# Patient Record
Sex: MALE | Race: WHITE | Employment: OTHER | URBAN - METROPOLITAN AREA
[De-identification: names, ages, dates, MRNs, and addresses within clinical notes are randomized per-mention and may not be internally consistent; named-entity substitution may affect disease eponyms.]

---

## 2017-01-03 ENCOUNTER — OFFICE VISIT (OUTPATIENT)
Dept: NEUROLOGY | Facility: CLINIC | Age: 62
End: 2017-01-03

## 2017-01-03 VITALS
HEIGHT: 73 IN | WEIGHT: 280 LBS | RESPIRATION RATE: 16 BRPM | DIASTOLIC BLOOD PRESSURE: 74 MMHG | BODY MASS INDEX: 37.11 KG/M2 | HEART RATE: 58 BPM | SYSTOLIC BLOOD PRESSURE: 132 MMHG

## 2017-01-03 DIAGNOSIS — G60.3 IDIOPATHIC PROGRESSIVE NEUROPATHY: Primary | ICD-10-CM

## 2017-01-03 DIAGNOSIS — G25.0 ESSENTIAL TREMOR: ICD-10-CM

## 2017-01-03 PROCEDURE — 99213 OFFICE O/P EST LOW 20 MIN: CPT | Performed by: OTHER

## 2017-01-03 RX ORDER — VALACYCLOVIR HYDROCHLORIDE 1 G/1
1 TABLET, FILM COATED ORAL AS NEEDED
Refills: 1 | COMMUNITY
Start: 2016-12-27 | End: 2018-05-02

## 2017-01-03 NOTE — PROGRESS NOTES
[facility] with [address]  2/24/1955  Primary Care Provider:  Dasha Diez MD    1/3/2017    62year old yo patient being seen for:  neuropathy    No changes despite getting , Rfl:   •  MULTIVITAMIN OR, one tablet daily, Disp: , Rfl:   PRN:     Allergies:    Biaxin [Clarithromy*    Rash      During today's visit, the following items were reviewed: medications, and the following relevant information are as noted in appropriate

## 2017-01-03 NOTE — PATIENT INSTRUCTIONS
Refill policies:    • Allow 2 business days for refills; controlled substances may take longer.   • Contact your pharmacy at least 5 days prior to running out of medication and have them send an electronic request or submit request through the “request re your physician has recommended that you have a procedure or additional testing performed. [facility] will contact your insurance carrier to obtain pre-certification or prior authorization.     Unfortunately, ALEKS has seen an increas

## 2017-01-05 ENCOUNTER — HOSPITAL (OUTPATIENT)
Dept: OTHER | Age: 62
End: 2017-01-05
Attending: RADIOLOGY

## 2017-01-05 ENCOUNTER — PRIOR ORIGINAL RECORDS (OUTPATIENT)
Dept: OTHER | Age: 62
End: 2017-01-05

## 2017-01-06 ENCOUNTER — PRIOR ORIGINAL RECORDS (OUTPATIENT)
Dept: OTHER | Age: 62
End: 2017-01-06

## 2017-01-06 LAB — UFCT: 22 CA SCORE

## 2017-01-17 ENCOUNTER — PRIOR ORIGINAL RECORDS (OUTPATIENT)
Dept: OTHER | Age: 62
End: 2017-01-17

## 2017-01-17 LAB
ALKALINE PHOSPHATATE(ALK PHOS): 53 IU/L
BILIRUBIN TOTAL: 0.5 MG/DL
BUN: 14 MG/DL
CALCIUM: 8.9 MG/DL
CHLORIDE: 105 MEQ/L
CHOLESTEROL, TOTAL: 227 MG/DL
CREATININE, SERUM: 1.1 MG/DL
GLUCOSE: 102 MG/DL
HDL CHOLESTEROL: 64 MG/DL
LDL CHOLESTEROL: 122 MG/DL
POTASSIUM, SERUM: 4.3 MEQ/L
PROTEIN, TOTAL: 6.9 G/DL
SGOT (AST): 18 IU/L
SGPT (ALT): 34 IU/L
SODIUM: 142 MEQ/L
TRIGLYCERIDES: 206 MG/DL

## 2017-01-20 ENCOUNTER — PRIOR ORIGINAL RECORDS (OUTPATIENT)
Dept: OTHER | Age: 62
End: 2017-01-20

## 2017-01-20 ENCOUNTER — HOSPITAL ENCOUNTER (OUTPATIENT)
Dept: CV DIAGNOSTICS | Facility: HOSPITAL | Age: 62
Discharge: HOME | End: 2017-01-20
Attending: INTERNAL MEDICINE
Payer: COMMERCIAL

## 2017-01-20 DIAGNOSIS — R07.2 CHEST PAIN, PRECORDIAL: ICD-10-CM

## 2017-01-20 DIAGNOSIS — R06.02 SHORTNESS OF BREATH: ICD-10-CM

## 2017-01-20 DIAGNOSIS — R93.1 ABNORMAL CT SCAN, HEART: ICD-10-CM

## 2017-01-20 NOTE — PROGRESS NOTES
Pt here today for a nuclear CPX ordered by Dr John Orellana. Pt expressed concern regarding walking on the treadmill.   After discussing the test with the patient, he chose to go ahead with the CPX nuclear stress test.  However, the CPX machine did not calibr

## 2017-01-24 ENCOUNTER — PRIOR ORIGINAL RECORDS (OUTPATIENT)
Dept: OTHER | Age: 62
End: 2017-01-24

## 2017-01-27 ENCOUNTER — HOSPITAL ENCOUNTER (OUTPATIENT)
Dept: CV DIAGNOSTICS | Facility: HOSPITAL | Age: 62
Discharge: HOME | End: 2017-01-27
Attending: INTERNAL MEDICINE
Payer: COMMERCIAL

## 2017-01-27 DIAGNOSIS — R06.02 SHORTNESS OF BREATH: ICD-10-CM

## 2017-01-27 DIAGNOSIS — R93.89 ABNORMAL CT SCAN: ICD-10-CM

## 2017-01-27 DIAGNOSIS — R07.2 CHEST PAIN, PRECORDIAL: ICD-10-CM

## 2017-01-27 PROCEDURE — 93018 CV STRESS TEST I&R ONLY: CPT | Performed by: INTERNAL MEDICINE

## 2017-01-27 PROCEDURE — 78452 HT MUSCLE IMAGE SPECT MULT: CPT

## 2017-01-27 PROCEDURE — 78452 HT MUSCLE IMAGE SPECT MULT: CPT | Performed by: INTERNAL MEDICINE

## 2017-01-27 PROCEDURE — 93017 CV STRESS TEST TRACING ONLY: CPT

## 2017-03-16 ENCOUNTER — PRIOR ORIGINAL RECORDS (OUTPATIENT)
Dept: OTHER | Age: 62
End: 2017-03-16

## 2017-03-16 ENCOUNTER — OFFICE VISIT (OUTPATIENT)
Dept: FAMILY MEDICINE CLINIC | Facility: CLINIC | Age: 62
End: 2017-03-16

## 2017-03-16 ENCOUNTER — LAB ENCOUNTER (OUTPATIENT)
Dept: LAB | Age: 62
End: 2017-03-16
Attending: FAMILY MEDICINE
Payer: COMMERCIAL

## 2017-03-16 VITALS
SYSTOLIC BLOOD PRESSURE: 156 MMHG | TEMPERATURE: 102 F | HEART RATE: 100 BPM | WEIGHT: 301.19 LBS | BODY MASS INDEX: 40 KG/M2 | DIASTOLIC BLOOD PRESSURE: 84 MMHG | RESPIRATION RATE: 25 BRPM

## 2017-03-16 DIAGNOSIS — R50.9 FEVER, UNSPECIFIED FEVER CAUSE: ICD-10-CM

## 2017-03-16 DIAGNOSIS — N39.0 URINARY TRACT INFECTION WITH HEMATURIA, SITE UNSPECIFIED: ICD-10-CM

## 2017-03-16 DIAGNOSIS — N39.0 URINARY TRACT INFECTION WITH HEMATURIA, SITE UNSPECIFIED: Primary | ICD-10-CM

## 2017-03-16 DIAGNOSIS — R31.9 URINARY TRACT INFECTION WITH HEMATURIA, SITE UNSPECIFIED: ICD-10-CM

## 2017-03-16 DIAGNOSIS — R31.9 URINARY TRACT INFECTION WITH HEMATURIA, SITE UNSPECIFIED: Primary | ICD-10-CM

## 2017-03-16 LAB
ALBUMIN SERPL-MCNC: 3.1 G/DL (ref 3.5–4.8)
ALP LIVER SERPL-CCNC: 104 U/L (ref 45–117)
ALT SERPL-CCNC: 45 U/L (ref 17–63)
AST SERPL-CCNC: 39 U/L (ref 15–41)
BASOPHILS # BLD AUTO: 0.02 X10(3) UL (ref 0–0.1)
BASOPHILS NFR BLD AUTO: 0.2 %
BILIRUB SERPL-MCNC: 0.7 MG/DL (ref 0.1–2)
BILIRUB UR QL STRIP.AUTO: NEGATIVE
BUN BLD-MCNC: 16 MG/DL (ref 8–20)
CALCIUM BLD-MCNC: 9.4 MG/DL (ref 8.3–10.3)
CHLORIDE: 97 MMOL/L (ref 101–111)
CO2: 26 MMOL/L (ref 22–32)
CREAT BLD-MCNC: 1.36 MG/DL (ref 0.7–1.3)
EOSINOPHIL # BLD AUTO: 0 X10(3) UL (ref 0–0.3)
EOSINOPHIL NFR BLD AUTO: 0 %
ERYTHROCYTE [DISTWIDTH] IN BLOOD BY AUTOMATED COUNT: 15.1 % (ref 11.5–16)
GLUCOSE BLD-MCNC: 123 MG/DL (ref 70–99)
GLUCOSE UR STRIP.AUTO-MCNC: NEGATIVE MG/DL
HCT VFR BLD AUTO: 42.8 % (ref 37–53)
HGB BLD-MCNC: 14.3 G/DL (ref 13–17)
IMMATURE GRANULOCYTE COUNT: 0.04 X10(3) UL (ref 0–1)
IMMATURE GRANULOCYTE RATIO %: 0.5 %
KETONES UR STRIP.AUTO-MCNC: NEGATIVE MG/DL
LYMPHOCYTES # BLD AUTO: 1.14 X10(3) UL (ref 0.9–4)
LYMPHOCYTES NFR BLD AUTO: 13.6 %
M PROTEIN MFR SERPL ELPH: 7.7 G/DL (ref 6.1–8.3)
MCH RBC QN AUTO: 26.9 PG (ref 27–33.2)
MCHC RBC AUTO-ENTMCNC: 33.4 G/DL (ref 31–37)
MCV RBC AUTO: 80.6 FL (ref 80–99)
MONOCYTES # BLD AUTO: 0.97 X10(3) UL (ref 0.1–0.6)
MONOCYTES NFR BLD AUTO: 11.5 %
MULTISTIX LOT#: NORMAL NUMERIC
NEUTROPHIL ABS PRELIM: 6.24 X10 (3) UL (ref 1.3–6.7)
NEUTROPHILS # BLD AUTO: 6.24 X10(3) UL (ref 1.3–6.7)
NEUTROPHILS NFR BLD AUTO: 74.2 %
NITRITE UR QL STRIP.AUTO: POSITIVE
NITRITE, URINE: POSITIVE
PH UR STRIP.AUTO: 5 [PH] (ref 4.5–8)
PH, URINE: 5.5 (ref 4.5–8)
PLATELET # BLD AUTO: 193 10(3)UL (ref 150–450)
POTASSIUM SERPL-SCNC: 3.6 MMOL/L (ref 3.6–5.1)
PROT UR STRIP.AUTO-MCNC: 100 MG/DL
PROTEIN (URINE DIPSTICK): >=300 MG/DL
RBC # BLD AUTO: 5.31 X10(6)UL (ref 4.3–5.7)
RBC #/AREA URNS AUTO: >10 /HPF
RED CELL DISTRIBUTION WIDTH-SD: 43.9 FL (ref 35.1–46.3)
SODIUM SERPL-SCNC: 134 MMOL/L (ref 136–144)
SP GR UR STRIP.AUTO: 1.02 (ref 1–1.03)
SPECIFIC GRAVITY: 1.02 (ref 1–1.03)
UROBILINOGEN UR STRIP.AUTO-MCNC: <2 MG/DL
UROBILINOGEN,SEMI-QN: 0.2 MG/DL (ref 0–1.9)
WBC # BLD AUTO: 8.4 X10(3) UL (ref 4–13)
WBC #/AREA URNS AUTO: >50 /HPF
WBC CLUMPS UR QL AUTO: PRESENT

## 2017-03-16 PROCEDURE — 81001 URINALYSIS AUTO W/SCOPE: CPT | Performed by: FAMILY MEDICINE

## 2017-03-16 PROCEDURE — 99214 OFFICE O/P EST MOD 30 MIN: CPT | Performed by: FAMILY MEDICINE

## 2017-03-16 PROCEDURE — 81003 URINALYSIS AUTO W/O SCOPE: CPT | Performed by: FAMILY MEDICINE

## 2017-03-16 PROCEDURE — 87086 URINE CULTURE/COLONY COUNT: CPT | Performed by: FAMILY MEDICINE

## 2017-03-16 PROCEDURE — 80053 COMPREHEN METABOLIC PANEL: CPT

## 2017-03-16 PROCEDURE — 85025 COMPLETE CBC W/AUTO DIFF WBC: CPT

## 2017-03-16 RX ORDER — CIPROFLOXACIN 500 MG/1
500 TABLET, FILM COATED ORAL 2 TIMES DAILY
Qty: 20 TABLET | Refills: 0 | Status: SHIPPED | OUTPATIENT
Start: 2017-03-16 | End: 2017-03-26

## 2017-03-16 NOTE — PATIENT INSTRUCTIONS
Check labs today. Start ciprofloxacin. Advice plenty of fluids. Check INR on Monday. Recheck in 2 weeks. Sooner if no improvement in fever  Return to clinic or go to ER if any gross blood in urine.

## 2017-03-16 NOTE — PROGRESS NOTES
[facility] [place]  PROGRESS NOTE  Chief Complaint:   Patient presents with:  Fever  Loss Of Appetite  Low Back Pain  Urinary      HPI:   This is a 65year old male presents complaining of fever, trouble passing urine for past 4 days.   Patient h Rheumatism, unspecified and fibrositis    • UTI (lower urinary tract infection) 1/15/2013   • Hernia cerebri ([address] 2/14/2014   • Obstructive sleep apnea (adult) (pediatric)      on CPAP Becki         Past Surgical History    KNEE REPLACEMENT SURGERY  [facility] acetaminophen-codeine (TYLENOL #3) 300-30 MG Oral Tab Take 1 tablet by mouth every 4 (four) hours as needed. Disp:  Rfl:    Warfarin Sodium 5 MG Oral Tab Take 5 mg by mouth.  Patient takes 7.5 mg on Sat,Sun,Tues,Thurs and takes 5 mg in Mon,Wed,Fri  Disp: Patient is alert, awake and oriented, well developed, well nourished, no apparent distress. EYES: PERRLA, EOMI, sclera anicteric, conjunctiva normal.  LUNGS: Clear to auscultation bilterally, no rales/rhonchi/wheezing.   HEART:  Regular rate and rhythm, Health Maintenance:  Annual Depression Screen due on 02/24/1955  Annual Physical due on 02/24/1958  FIT Colorectal Screening due on 02/24/2006  PSA due on 02/07/2014  Influenza Vaccine(1) due on 09/01/2016  Colonoscopy,10 Years due on 01/15/2017    P

## 2017-03-17 ENCOUNTER — TELEPHONE (OUTPATIENT)
Dept: FAMILY MEDICINE CLINIC | Facility: CLINIC | Age: 62
End: 2017-03-17

## 2017-03-17 NOTE — TELEPHONE ENCOUNTER
----- Message from Maximiliano Oshea MD sent at 3/17/2017 10:09 AM CDT -----  We will wait for urine culture result.

## 2017-03-17 NOTE — TELEPHONE ENCOUNTER
----- Message from Everette Jones MD sent at 3/17/2017 10:08 AM CDT -----  Please inform patient that his kidney functions are slightly declined this is likely secondary to dehydration due to him not having enough oral intake.   Advised so plenty of fluids,

## 2017-03-17 NOTE — TELEPHONE ENCOUNTER
Let pt know the following below. Pt verbalized his understanding and had no other questions at this time.

## 2017-03-17 NOTE — TELEPHONE ENCOUNTER
Advised to continue with antibiotics over the weekend. And also advised to push more fluids. Use ibuprofen, Tylenol for fever. Advised to return to clinic on Monday if fever does not improve.   Advised to go to emergency room if any gross blood in the ur

## 2017-03-17 NOTE — TELEPHONE ENCOUNTER
Let pt know the following below. Pt verbalized her understanding. Patient states that his fever is still running high around 102 and still is feeling the same as he did yesterday. Dr Mani Mccloud can you please advise.

## 2017-03-20 ENCOUNTER — OFFICE VISIT (OUTPATIENT)
Dept: FAMILY MEDICINE CLINIC | Facility: CLINIC | Age: 62
End: 2017-03-20

## 2017-03-20 ENCOUNTER — TELEPHONE (OUTPATIENT)
Dept: FAMILY MEDICINE CLINIC | Facility: CLINIC | Age: 62
End: 2017-03-20

## 2017-03-20 VITALS
HEIGHT: 71.5 IN | HEART RATE: 79 BPM | BODY MASS INDEX: 40.67 KG/M2 | SYSTOLIC BLOOD PRESSURE: 140 MMHG | TEMPERATURE: 98 F | OXYGEN SATURATION: 99 % | RESPIRATION RATE: 16 BRPM | WEIGHT: 297 LBS | DIASTOLIC BLOOD PRESSURE: 82 MMHG

## 2017-03-20 DIAGNOSIS — Z00.00 HEALTH CARE MAINTENANCE: ICD-10-CM

## 2017-03-20 DIAGNOSIS — L98.9 SKIN LESION: ICD-10-CM

## 2017-03-20 DIAGNOSIS — K21.9 GASTROESOPHAGEAL REFLUX DISEASE WITHOUT ESOPHAGITIS: Primary | ICD-10-CM

## 2017-03-20 PROCEDURE — 99213 OFFICE O/P EST LOW 20 MIN: CPT | Performed by: FAMILY MEDICINE

## 2017-03-20 PROCEDURE — 99396 PREV VISIT EST AGE 40-64: CPT | Performed by: FAMILY MEDICINE

## 2017-03-20 RX ORDER — ROSUVASTATIN CALCIUM 20 MG/1
20 TABLET, COATED ORAL NIGHTLY
COMMUNITY
End: 2018-12-21

## 2017-03-20 NOTE — TELEPHONE ENCOUNTER
----- Message from Adrianne Aldridge MD sent at 3/20/2017 10:26 AM CDT -----  Please inform patient that urine culture shows that he has a E. coli infection. Advised to finish the course of antibiotics and return to clinic if his symptoms does not improve.

## 2017-03-20 NOTE — PATIENT INSTRUCTIONS
Good exam     Obtain ultrasound     Referral to dr. Kurt Tate    Urine recheck after cipro is done 2 - 3days.   (and blood work)

## 2017-03-20 NOTE — PROGRESS NOTES
[facility] [place]  PROGRESS NOTE  Chief Complaint:   Patient presents with:  Physical      HPI:   This is a 65year old male coming in for general checkup. Patient has not been to a primary care physician in several years.     I reviewed his cu fL   Neutrophil Absolute Prelim 6.24 1.30-6.70 x10 (3) uL   Neutrophil Absolute 6.24 1.30-6.70 x10(3) uL   Lymphocyte Absolute 1.14 0.90-4.00 x10(3) uL   Monocyte Absolute 0.97 (H) 0.10-0.60 x10(3) uL   Eosinophil Absolute 0.00 0.00-0.30 x10(3) uL   Basoph mouth daily. (Patient taking differently: Take 200 mg by mouth as needed.) Disp: 90 capsule Rfl: 0   acetaminophen-codeine (TYLENOL #3) 300-30 MG Oral Tab Take 1 tablet by mouth every 4 (four) hours as needed.  Disp:  Rfl:    Warfarin Sodium 5 MG Oral Tab T EOMI, PERRLA, no scleral icterus, conjunctivae clear bilaterally, no eye discharge   Ears: External normal. Nose: patent, no nasal discharge   Throat:  No tonsillar erythema or exudate. Mouth:  No oral lesions or ulcerations, good dentition.     NECK: Tracy and embolism     Peripheral neuropathy (HCC)     DVT (deep venous thrombosis) (HCC)     Factor V deficiency (HCC)     Vasovagal syncope     Arthritis     Cervical stenosis of spine     Neuropathy, peripheral (HCC)     Lower urinary tract infectious disease

## 2017-03-21 ENCOUNTER — HOSPITAL ENCOUNTER (OUTPATIENT)
Dept: ULTRASOUND IMAGING | Age: 62
Discharge: HOME | End: 2017-03-21
Attending: FAMILY MEDICINE
Payer: COMMERCIAL

## 2017-03-21 DIAGNOSIS — R73.9 HYPERGLYCEMIA: Primary | ICD-10-CM

## 2017-03-21 DIAGNOSIS — L98.9 SKIN LESION: ICD-10-CM

## 2017-03-21 DIAGNOSIS — Z00.00 HEALTHCARE MAINTENANCE: ICD-10-CM

## 2017-03-21 DIAGNOSIS — B96.20 E. COLI UTI: ICD-10-CM

## 2017-03-21 DIAGNOSIS — N39.0 E. COLI UTI: ICD-10-CM

## 2017-03-21 PROCEDURE — 76700 US EXAM ABDOM COMPLETE: CPT

## 2017-03-21 NOTE — PROGRESS NOTES
Orders placed for blood work     Is patient doing follow up urinalysis and culture here or also at [facility]?

## 2017-03-23 ENCOUNTER — PRIOR ORIGINAL RECORDS (OUTPATIENT)
Dept: OTHER | Age: 62
End: 2017-03-23

## 2017-03-24 ENCOUNTER — TELEPHONE (OUTPATIENT)
Dept: FAMILY MEDICINE CLINIC | Facility: CLINIC | Age: 62
End: 2017-03-24

## 2017-03-24 NOTE — TELEPHONE ENCOUNTER
----- Message from Matt Schafer MD sent at 3/22/2017 12:55 PM CDT -----  ultrssound report revieed    Area of concern without abnormality. No hernia in the area . Liver is not enlarged. - there is fatty liver.      There is also noted mild splenomeg

## 2017-03-30 ENCOUNTER — PRIOR ORIGINAL RECORDS (OUTPATIENT)
Dept: OTHER | Age: 62
End: 2017-03-30

## 2017-03-31 ENCOUNTER — TELEPHONE (OUTPATIENT)
Dept: FAMILY MEDICINE CLINIC | Facility: CLINIC | Age: 62
End: 2017-03-31

## 2017-04-05 NOTE — TELEPHONE ENCOUNTER
Microscopic urine within normal limits. Hemoglobin A1c 6.4%, slightly elevated. CMP: Glucose 117, slightly elevated. Rest of CMP is essentially normal.  Lipid panel: Triglycerides high at 197, slightly elevated .   Rest of lipid panel within peyman

## 2017-04-05 NOTE — TELEPHONE ENCOUNTER
----- Message from Kaylynn Hall sent at 4/4/2017  4:03 PM CDT -----  Contact: pt  Test results / 2nd call

## 2017-04-05 NOTE — TELEPHONE ENCOUNTER
Patient states that he had blood work done on 3/30/17 at [facility] and has not heard the results yet. I informed that patient that we do not always receive the results from [facility].  I let him know that we did not receive his results yet.     H

## 2017-04-05 NOTE — TELEPHONE ENCOUNTER
Patient informed of recent lab results and recommendations as per Oscar Jimenez NP.     Patient states he called the lab at [facility] and asked if the results were faxed to Dr. Randi Everett for his review and they told him that they faxed the results on 3/31/1

## 2017-05-05 ENCOUNTER — PRIOR ORIGINAL RECORDS (OUTPATIENT)
Dept: OTHER | Age: 62
End: 2017-05-05

## 2017-05-11 LAB
ALBUMIN: 3.7 G/DL
ALKALINE PHOSPHATATE(ALK PHOS): 79 IU/L
BILIRUBIN TOTAL: 0.7 MG/DL
BUN: 18 MG/DL
CALCIUM: 9.1 MG/DL
CHLORIDE: 105 MEQ/L
CHOLESTEROL, TOTAL: 199 MG/DL
CREATININE, SERUM: 1.16 MG/DL
GLOBULIN: 3.2 G/DL
GLUCOSE: 117 MG/DL
HDL CHOLESTEROL: 49 MG/DL
HEMOGLOBIN A1C: 6.4 %
LDL CHOLESTEROL: 111 MG/DL
POTASSIUM, SERUM: 4.6 MEQ/L
PROTEIN, TOTAL: 6.9 G/DL
SGOT (AST): 22 IU/L
SGPT (ALT): 43 IU/L
SODIUM: 143 MEQ/L
TRIGLYCERIDES: 197 MG/DL

## 2017-05-12 LAB
ALBUMIN: 3.1 G/DL
ALKALINE PHOSPHATATE(ALK PHOS): 104 IU/L
BILIRUBIN TOTAL: 0.7 MG/DL
BUN: 16 MG/DL
CALCIUM: 9.4 MG/DL
CHLORIDE: 97 MEQ/L
CREATININE, SERUM: 1.36 MG/DL
GLUCOSE: 123 MG/DL
HEMATOCRIT: 42.8 %
HEMOGLOBIN: 14.3 G/DL
PLATELETS: 193 K/UL
POTASSIUM, SERUM: 3.6 MEQ/L
PROTEIN, TOTAL: 7.7 G/DL
RED BLOOD COUNT: 5.31 X 10-6/U
SGOT (AST): 39 IU/L
SGPT (ALT): 45 IU/L
SODIUM: 134 MEQ/L
WHITE BLOOD COUNT: 8.4 X 10-3/U

## 2017-06-12 RX ORDER — PANTOPRAZOLE SODIUM 40 MG/1
TABLET, DELAYED RELEASE ORAL
Qty: 90 TABLET | Refills: 1 | Status: SHIPPED | OUTPATIENT
Start: 2017-06-12 | End: 2018-01-27

## 2017-06-12 RX ORDER — DULOXETIN HYDROCHLORIDE 30 MG/1
CAPSULE, DELAYED RELEASE ORAL
Qty: 90 CAPSULE | Refills: 1 | Status: SHIPPED | OUTPATIENT
Start: 2017-06-12 | End: 2018-01-27 | Stop reason: SINTOL

## 2017-06-12 NOTE — TELEPHONE ENCOUNTER
Medication: Duloxetine & Pantoprazole    Date of last refill: 12/22/16 # 90  [place]     Last office visit: 1/3/2017  Due back to clinic per last office note:  RTN in 1 year by 01/03/18  Date next office visit scheduled:

## 2017-06-23 ENCOUNTER — PRIOR ORIGINAL RECORDS (OUTPATIENT)
Dept: OTHER | Age: 62
End: 2017-06-23

## 2017-06-26 ENCOUNTER — PRIOR ORIGINAL RECORDS (OUTPATIENT)
Dept: OTHER | Age: 62
End: 2017-06-26

## 2017-06-28 ENCOUNTER — PRIOR ORIGINAL RECORDS (OUTPATIENT)
Dept: OTHER | Age: 62
End: 2017-06-28

## 2017-06-28 LAB
BUN: 17 MG/DL
CALCIUM: 9.4 MG/DL
CHLORIDE: 106 MEQ/L
CREATININE, SERUM: 1.24 MG/DL
GLUCOSE: 132 MG/DL
HEMATOCRIT: 40.8 %
HEMOGLOBIN: 13.9 G/DL
INR: 3.5
PLATELETS: 183 K/UL
POTASSIUM, SERUM: 3.8 MEQ/L
RED BLOOD COUNT: 5 X 10-6/U
SODIUM: 144 MEQ/L
WHITE BLOOD COUNT: 4.6 X 10-3/U

## 2017-06-29 ENCOUNTER — PRIOR ORIGINAL RECORDS (OUTPATIENT)
Dept: OTHER | Age: 62
End: 2017-06-29

## 2017-07-03 ENCOUNTER — PRIOR ORIGINAL RECORDS (OUTPATIENT)
Dept: OTHER | Age: 62
End: 2017-07-03

## 2017-07-05 PROCEDURE — 88305 TISSUE EXAM BY PATHOLOGIST: CPT | Performed by: INTERNAL MEDICINE

## 2017-08-22 ENCOUNTER — PRIOR ORIGINAL RECORDS (OUTPATIENT)
Dept: OTHER | Age: 62
End: 2017-08-22

## 2017-08-23 ENCOUNTER — PRIOR ORIGINAL RECORDS (OUTPATIENT)
Dept: OTHER | Age: 62
End: 2017-08-23

## 2017-08-25 LAB
ALKALINE PHOSPHATATE(ALK PHOS): 53 IU/L
BILIRUBIN TOTAL: 0.6 MG/DL
BUN: 14 MG/DL
CALCIUM: 9.5 MG/DL
CHLORIDE: 105 MEQ/L
CREATININE, SERUM: 0.9 MG/DL
GLUCOSE: 92 MG/DL
HEMATOCRIT: 42.7 %
HEMOGLOBIN: 13.7 G/DL
INR: 2.6
PLATELETS: 234 K/UL
POTASSIUM, SERUM: 4.4 MEQ/L
PROTEIN, TOTAL: 6.5 G/DL
RED BLOOD COUNT: 5.1 X 10-6/U
SGOT (AST): 29 IU/L
SGPT (ALT): 28 IU/L
SODIUM: 143 MEQ/L
WHITE BLOOD COUNT: 4.7 X 10-3/U

## 2017-08-29 ENCOUNTER — TELEPHONE (OUTPATIENT)
Dept: FAMILY MEDICINE CLINIC | Facility: CLINIC | Age: 62
End: 2017-08-29

## 2017-08-29 ENCOUNTER — PRIOR ORIGINAL RECORDS (OUTPATIENT)
Dept: OTHER | Age: 62
End: 2017-08-29

## 2017-08-29 LAB
ALBUMIN: 4.8 G/DL
ALKALINE PHOSPHATASE: 53
ALT: 28
ANION GAP: 18
AST: 29
BASOPHIL %: 0
BILIRUBIN, TOTAL: 0.6 MG/DL
CALCIUM: 9.5
CHLORIDE: 105
CO2: 24
CREATININE: 0.9 MG/DL
EOSINOPHIL %: 2
GFR NON-AFRICAN AMERICAN: 91
GLUCOSE BLOOD: 92
HEMATOCRIT: 42.7 %
HEMOGLOBIN: 13.7 G/DL (ref 13–17)
LYMPHOCYTE %: 32
Lab: NEGATIVE
MCH: 27 PG
MCHC: 32 G/DL
MCV: 84 FL
MONOCYTE %: 9
MPV: 9.4
NEUTROPHIL %: 56
PLATELETS: 234 X10E3/UL
POTASSIUM: 4.4
RBC: 5.1 /HPF
RDW-CV: 15
SODIUM: 143
TOTAL PROTEIN: 6.5
UREA NITROGEN (BODY FLUID): 14
WBC: 4.7 /HPF

## 2017-08-30 ENCOUNTER — TELEPHONE (OUTPATIENT)
Dept: FAMILY MEDICINE CLINIC | Facility: CLINIC | Age: 62
End: 2017-08-30

## 2017-08-30 ENCOUNTER — OFFICE VISIT (OUTPATIENT)
Dept: FAMILY MEDICINE CLINIC | Facility: CLINIC | Age: 62
End: 2017-08-30

## 2017-08-30 VITALS
TEMPERATURE: 98 F | WEIGHT: 289 LBS | HEIGHT: 71.5 IN | HEART RATE: 78 BPM | DIASTOLIC BLOOD PRESSURE: 76 MMHG | RESPIRATION RATE: 16 BRPM | SYSTOLIC BLOOD PRESSURE: 120 MMHG | BODY MASS INDEX: 39.58 KG/M2

## 2017-08-30 DIAGNOSIS — M17.11 PRIMARY OSTEOARTHRITIS OF RIGHT KNEE: ICD-10-CM

## 2017-08-30 DIAGNOSIS — Z86.718 HISTORY OF DVT (DEEP VEIN THROMBOSIS): ICD-10-CM

## 2017-08-30 DIAGNOSIS — Z01.818 PREOPERATIVE EXAMINATION: Primary | ICD-10-CM

## 2017-08-30 PROCEDURE — 99243 OFF/OP CNSLTJ NEW/EST LOW 30: CPT | Performed by: FAMILY MEDICINE

## 2017-08-30 RX ORDER — CELECOXIB 200 MG/1
200 CAPSULE ORAL AS NEEDED
COMMUNITY
End: 2018-03-05

## 2017-08-30 NOTE — PROGRESS NOTES
[facility] [place]  PROGRESS NOTE  Chief Complaint:   Patient presents with:  Pre-Op Exam      HPI:   This is a 65year old male coming in for preoperative evaluation. Patient has had long history for right knee discomfort and osteoarthritis. (pulmonary embolism) (HCC)          on chronic coumadin    •  Esophageal reflux      •  Prostate cancer (HCC)      •  Muscle weakness (generalized)      •  Displacement of lumbar intervertebral disc without myelopathy      •  Myalgia and myositis, unspecif throat  INTEGUMENTARY:  Denies rashes, itching.     CARDIOVASCULAR:   SEE HPI    Denies chest pain, chest pressure, chest discomfort, palpitations, edema, dyspnea on exertion or at rest.  RESPIRATORY:  Denies shortness of breath, wheezing, cough or sputum p normal gait, strength and tone, sensory intact,  PSYCH: no depression or anxiety noted. ASSESSMENT AND PLAN:   1. Preoperative examination  2. Primary osteoarthritis of right knee      3.  History of DVT (deep vein thrombosis)      Medically stable and c

## 2017-08-30 NOTE — TELEPHONE ENCOUNTER
Patient informed that Dr. Fabian Chau received the EKG and lab results for this afternoons appointment.

## 2017-08-30 NOTE — TELEPHONE ENCOUNTER
Has an appt this afternoon with Dr Rasheed Vo. Wanting to know if Dr Rasheed Vo has received the labs from [facility]. Please call back.

## 2017-09-05 ENCOUNTER — TELEPHONE (OUTPATIENT)
Dept: FAMILY MEDICINE CLINIC | Facility: CLINIC | Age: 62
End: 2017-09-05

## 2017-11-03 ENCOUNTER — MYAURORA ACCOUNT LINK (OUTPATIENT)
Dept: OTHER | Age: 62
End: 2017-11-03

## 2017-11-03 ENCOUNTER — PRIOR ORIGINAL RECORDS (OUTPATIENT)
Dept: OTHER | Age: 62
End: 2017-11-03

## 2017-11-13 RX ORDER — TOPIRAMATE 100 MG/1
TABLET, FILM COATED ORAL
Qty: 180 TABLET | Refills: 1 | Status: SHIPPED | OUTPATIENT
Start: 2017-11-13 | End: 2017-12-21

## 2017-11-13 NOTE — TELEPHONE ENCOUNTER
Medication: Topiramate      Date of last refill: 101/19/16 with 1 addt refill # 180  [place]      Last office visit: 1/3/2017  Due back to clinic per last office note:  RTN in 1 year by 01/03/18  Date next office visit s

## 2017-11-27 ENCOUNTER — CHARTING TRANS (OUTPATIENT)
Dept: OTHER | Age: 62
End: 2017-11-27

## 2017-12-09 ENCOUNTER — LABORATORY ENCOUNTER (OUTPATIENT)
Dept: LAB | Age: 62
End: 2017-12-09
Attending: FAMILY MEDICINE
Payer: COMMERCIAL

## 2017-12-09 ENCOUNTER — OFFICE VISIT (OUTPATIENT)
Dept: FAMILY MEDICINE CLINIC | Facility: CLINIC | Age: 62
End: 2017-12-09

## 2017-12-09 VITALS
HEART RATE: 72 BPM | RESPIRATION RATE: 16 BRPM | TEMPERATURE: 98 F | BODY MASS INDEX: 41.48 KG/M2 | WEIGHT: 296.25 LBS | DIASTOLIC BLOOD PRESSURE: 66 MMHG | SYSTOLIC BLOOD PRESSURE: 132 MMHG | HEIGHT: 71 IN

## 2017-12-09 DIAGNOSIS — N30.00 ACUTE CYSTITIS WITHOUT HEMATURIA: Primary | ICD-10-CM

## 2017-12-09 DIAGNOSIS — N30.00 ACUTE CYSTITIS WITHOUT HEMATURIA: ICD-10-CM

## 2017-12-09 PROBLEM — Z96.651 STATUS POST TOTAL RIGHT KNEE REPLACEMENT: Status: ACTIVE | Noted: 2017-09-07

## 2017-12-09 PROBLEM — E78.2 MIXED HYPERLIPIDEMIA: Status: ACTIVE | Noted: 2017-09-07

## 2017-12-09 PROBLEM — I82.401 DEEP VEIN THROMBOSIS (DVT) OF RIGHT LOWER EXTREMITY (HCC): Status: ACTIVE | Noted: 2017-08-23

## 2017-12-09 PROBLEM — Z86.711 PERSONAL HISTORY OF PULMONARY EMBOLISM: Status: ACTIVE | Noted: 2017-08-23

## 2017-12-09 PROCEDURE — 87088 URINE BACTERIA CULTURE: CPT | Performed by: FAMILY MEDICINE

## 2017-12-09 PROCEDURE — 87086 URINE CULTURE/COLONY COUNT: CPT | Performed by: FAMILY MEDICINE

## 2017-12-09 PROCEDURE — 87186 SC STD MICRODIL/AGAR DIL: CPT | Performed by: FAMILY MEDICINE

## 2017-12-09 PROCEDURE — 81001 URINALYSIS AUTO W/SCOPE: CPT | Performed by: FAMILY MEDICINE

## 2017-12-09 PROCEDURE — 99214 OFFICE O/P EST MOD 30 MIN: CPT | Performed by: FAMILY MEDICINE

## 2017-12-09 RX ORDER — SULFAMETHOXAZOLE AND TRIMETHOPRIM 800; 160 MG/1; MG/1
1 TABLET ORAL 2 TIMES DAILY
Qty: 20 TABLET | Refills: 0 | Status: SHIPPED | OUTPATIENT
Start: 2017-12-09 | End: 2017-12-19

## 2017-12-09 RX ORDER — RIVAROXABAN 20 MG/1
1 TABLET, FILM COATED ORAL DAILY
COMMUNITY
Start: 2017-11-06 | End: 2018-12-21

## 2017-12-09 NOTE — PROGRESS NOTES
Chief Complaint:   Patient presents with:  Low Back Pain: Lower back pain, and joint pain.  Jose stated he thinks its due to a UTI  Nasal Congestion: Cough, sneezing, and nasal congestion      HPI:   This is a 65year old male coming in for back discomfo MOUTH DAILY Disp: 90 tablet Rfl: 1   Rosuvastatin Calcium 20 MG Oral Tab Take 20 mg by mouth nightly. Disp:  Rfl:    ValACYclovir HCl 1 G Oral Tab Take 1 tablet by mouth as needed.    Disp:  Rfl: 1   Metoprolol Tartrate (LOPRESSOR) 50 MG Oral Tab Take 75 mg oriented, well developed, well nourished  Male    , no apparent distress. HEENT:  Head:  Normocephalic, atraumatic    NECK: Supple,  no JVD, no thyromegaly. SKIN: No rashes, no skin lesion, no bruising, good turgor.     HEART:  Regular rate and rhythm, no cystitis without hematuria     Personal history of pulmonary embolism     Osteoarthritis     Malignant tumor of prostate ([address]     Mixed hyperlipidemia     Migraine headache     Impotence     Herpes simplex virus (HSV) infection     Deep vein thrombosis (D

## 2017-12-11 ENCOUNTER — TELEPHONE (OUTPATIENT)
Dept: FAMILY MEDICINE CLINIC | Facility: CLINIC | Age: 62
End: 2017-12-11

## 2017-12-11 NOTE — TELEPHONE ENCOUNTER
Patient notified of results and recommendations, patient expressed understanding and thanks. Patient also wanted to know if he should take his cephalexin 500 mg on Friday. States this 1hr prior to dental procedure has appt this Friday. Please advise?

## 2017-12-11 NOTE — TELEPHONE ENCOUNTER
----- Message from ANTONIA Sawant sent at 12/11/2017  9:16 AM CST -----  Dr. Bryan Blair's patient. Please let patient know his urine culture is positive for E. coli. He is on an appropriate antibiotic.   If continues to have symptoms, needs a follo

## 2017-12-12 ENCOUNTER — PRIOR ORIGINAL RECORDS (OUTPATIENT)
Dept: OTHER | Age: 62
End: 2017-12-12

## 2017-12-18 ENCOUNTER — TELEPHONE (OUTPATIENT)
Dept: FAMILY MEDICINE CLINIC | Facility: CLINIC | Age: 62
End: 2017-12-18

## 2017-12-18 NOTE — TELEPHONE ENCOUNTER
Patient informed of recommendations from Pradip Reyna NP. Patient states that he would like to see what Dr. Roxanne Campos recommends tomorrow.

## 2017-12-18 NOTE — TELEPHONE ENCOUNTER
I recommend pt make appt with provider to reassess issue, not lab appt; thus will not provide order. Please have pt make appt with provider.

## 2017-12-18 NOTE — TELEPHONE ENCOUNTER
Patient states that he completed the 10 day course of antibiotic and thought he was feeling better and then on Saturday evening he started having low back pain and fatigue again.   He states that the last time this happened he had to be on 2 rounds of antib

## 2017-12-18 NOTE — TELEPHONE ENCOUNTER
Appt made to recheck urine specimen with lab post antibiotics as per patient's request and will follow up with Dr. Saint Ammons if abnormal.  Order needed, please advise.

## 2017-12-19 ENCOUNTER — TELEPHONE (OUTPATIENT)
Dept: FAMILY MEDICINE CLINIC | Facility: CLINIC | Age: 62
End: 2017-12-19

## 2017-12-19 DIAGNOSIS — N30.00 ACUTE CYSTITIS WITHOUT HEMATURIA: Primary | ICD-10-CM

## 2017-12-19 NOTE — TELEPHONE ENCOUNTER
Patient informed that Dr. Torri Corea placed an order to recheck the urine for tomorrow and proceed with appt.

## 2017-12-19 NOTE — TELEPHONE ENCOUNTER
Lab order needed for tomorrow's appt, please advise. Future appt:     Your appointments     Date & Time Appointment Department [facility])    Dec 20, 2017 11:15 AM CST Laboratory Visit with [facility] ([facility])    Oct 01,

## 2017-12-19 NOTE — TELEPHONE ENCOUNTER
----- Message from Lelo Sykes sent at 12/19/2017  3:06 PM CST -----  Waiting to hear back from call yesterday.

## 2017-12-20 ENCOUNTER — APPOINTMENT (OUTPATIENT)
Dept: LAB | Age: 62
End: 2017-12-20
Attending: FAMILY MEDICINE
Payer: COMMERCIAL

## 2017-12-20 DIAGNOSIS — N30.00 ACUTE CYSTITIS WITHOUT HEMATURIA: ICD-10-CM

## 2017-12-20 PROCEDURE — 81003 URINALYSIS AUTO W/O SCOPE: CPT | Performed by: FAMILY MEDICINE

## 2017-12-21 ENCOUNTER — TELEPHONE (OUTPATIENT)
Dept: FAMILY MEDICINE CLINIC | Facility: CLINIC | Age: 62
End: 2017-12-21

## 2017-12-21 ENCOUNTER — OFFICE VISIT (OUTPATIENT)
Dept: FAMILY MEDICINE CLINIC | Facility: CLINIC | Age: 62
End: 2017-12-21

## 2017-12-21 VITALS
SYSTOLIC BLOOD PRESSURE: 128 MMHG | HEIGHT: 71.5 IN | BODY MASS INDEX: 40.4 KG/M2 | DIASTOLIC BLOOD PRESSURE: 72 MMHG | WEIGHT: 295 LBS | RESPIRATION RATE: 20 BRPM | OXYGEN SATURATION: 98 % | TEMPERATURE: 98 F | HEART RATE: 70 BPM

## 2017-12-21 DIAGNOSIS — J01.00 ACUTE NON-RECURRENT MAXILLARY SINUSITIS: Primary | ICD-10-CM

## 2017-12-21 PROCEDURE — 99214 OFFICE O/P EST MOD 30 MIN: CPT | Performed by: NURSE PRACTITIONER

## 2017-12-21 RX ORDER — AMOXICILLIN 500 MG/1
500 CAPSULE ORAL 3 TIMES DAILY
Qty: 30 CAPSULE | Refills: 0 | Status: SHIPPED | OUTPATIENT
Start: 2017-12-21 | End: 2017-12-31

## 2017-12-21 NOTE — PROGRESS NOTES
Chief complaint:  Patient presents with:  Cough: chest congestion, sinus pressure, sinus congestion-clear/[id number]     HPI:   Balta Rodas is a 65year old male who presents for upper respiratory symptoms.  C/o: productive Cough, sinus congestion and Warfarin Sodium 5 MG Oral Tab Taking 7.5 mg on Monday/ Thursday and 5 mg the remaining 5 days  Disp:  Rfl:       Past Medical History:   Diagnosis Date   • Backache, unspecified    • Displacement of lumbar intervertebral disc without myelopathy    • DVT (d REVIEW OF SYSTEMS:   GENERAL: feels well otherwise  SKIN: no rashes  EYES:denies blurred vision or double vision  HEENT: see HPI  LUNGS: see HPI  CARDIOVASCULAR: denies chest pain on exertion  GI: no nausea or abdominal pain  NEURO: denies headaches    EXA Flonase (fluticasone is generic) nasal spray--1 spray each nostril twice a day. Look down at toes when using this nasal spray. Tylenol over the counter for discomfort. Coricidin over the counter for congestion symptom relief.   Return if symptoms worsen o ABRS may be diagnosed if you’ve had an upper respiratory infection like a cold and cough for 10 or more days without improvement or with worsening symptoms. Your healthcare provider will ask about your symptoms and your medical history.  The provider will c © 9785-2422 [address] [facility] [place]. All rights reserved. This information is not intended as a substitute for professional medical care. Always follow your healthcare professional's instructions.             The

## 2017-12-21 NOTE — TELEPHONE ENCOUNTER
Notified patient of results. Patient states he is not feeling better has URI sxs. States he has bad cough, chest congestion, sore throat. Advised patient to make appt. States lives in 80year old Mother in law.

## 2017-12-21 NOTE — TELEPHONE ENCOUNTER
Dr. Van Jenkins is out of office today. UA was normal. Please let patient know. Thank you.  Kiara Snyder, 12/21/17, 11:10 AM

## 2017-12-21 NOTE — PATIENT INSTRUCTIONS
Push fluids, rest.  Antibiotic as prescribed, take with food. Flonase (fluticasone is generic) nasal spray--1 spray each nostril twice a day. Look down at toes when using this nasal spray. Tylenol over the counter for discomfort.   Coricidin over the coun ABRS may be diagnosed if you’ve had an upper respiratory infection like a cold and cough for 10 or more days without improvement or with worsening symptoms. Your healthcare provider will ask about your symptoms and your medical history.  The provider will c © 6084-8023 [address] [facility] [place]. All rights reserved. This information is not intended as a substitute for professional medical care. Always follow your healthcare professional's instructions.

## 2017-12-23 ENCOUNTER — TELEPHONE (OUTPATIENT)
Dept: FAMILY MEDICINE CLINIC | Facility: CLINIC | Age: 62
End: 2017-12-23

## 2017-12-23 NOTE — TELEPHONE ENCOUNTER
----- Message from Madeleine Marques MD sent at 12/21/2017  8:06 AM CST -----  Repeat UA  - all normalized . No further blood or protein     Recommend recheck in future.

## 2018-01-05 ENCOUNTER — PRIOR ORIGINAL RECORDS (OUTPATIENT)
Dept: OTHER | Age: 63
End: 2018-01-05

## 2018-01-05 LAB
ALT (SGPT): 32 U/L
AST (SGOT): 23 U/L
CHOLESTEROL, TOTAL: 209 MG/DL
HDL CHOLESTEROL: 63 MG/DL
LDL CHOLESTEROL: 116 MG/DL
TRIGLYCERIDES: 149 MG/DL

## 2018-01-23 ENCOUNTER — TELEPHONE (OUTPATIENT)
Dept: FAMILY MEDICINE CLINIC | Facility: CLINIC | Age: 63
End: 2018-01-23

## 2018-01-23 RX ORDER — PANTOPRAZOLE SODIUM 40 MG/1
TABLET, DELAYED RELEASE ORAL
Qty: 90 TABLET | Refills: 0 | OUTPATIENT
Start: 2018-01-23

## 2018-01-23 RX ORDER — PANTOPRAZOLE SODIUM 40 MG/1
40 TABLET, DELAYED RELEASE ORAL
Qty: 90 TABLET | Refills: 1 | Status: CANCELLED | OUTPATIENT
Start: 2018-01-23

## 2018-01-23 RX ORDER — DULOXETIN HYDROCHLORIDE 30 MG/1
CAPSULE, DELAYED RELEASE ORAL
Qty: 90 CAPSULE | Refills: 0 | OUTPATIENT
Start: 2018-01-23

## 2018-01-23 NOTE — TELEPHONE ENCOUNTER
Patient wants to know how he can wean off Cymbalta . Patient states that he will talk to primary Dr to wean off cymbalta.  Cx appt on 02/05/18 and to cancel refill request for cymbalta and pantoprazole

## 2018-01-23 NOTE — TELEPHONE ENCOUNTER
Patient was in to see Dr. Niya Noonan as a new patient on 12/9/17  States he discussed having Dr. Niya Noonan taking over prescribing his medications  States he takes pantoprazole and would like a 90 day refill to [address]    Also states hi

## 2018-01-25 NOTE — TELEPHONE ENCOUNTER
Patient notified of Dr. Kalpesh Blair's below recommendations.  Appt made discuss medications   Future Appointments  Date Time Provider Serjio Dahiana   1/27/2018 9:00 AM Ronald Ramos MD [facility] [place] [facility] [place]   2/5/2018 1:00 PM Gela Antunez P

## 2018-01-25 NOTE — TELEPHONE ENCOUNTER
Chart reviewed     Patient needs appointment     I do not see that I have seen regarding duloxetine use - I would need appointment to evaluate regarding weaning.      RE:  Pantoprazole  - patient was referred to Gastroenterologist - patient should be contac

## 2018-01-27 ENCOUNTER — OFFICE VISIT (OUTPATIENT)
Dept: FAMILY MEDICINE CLINIC | Facility: CLINIC | Age: 63
End: 2018-01-27

## 2018-01-27 VITALS
SYSTOLIC BLOOD PRESSURE: 118 MMHG | OXYGEN SATURATION: 96 % | DIASTOLIC BLOOD PRESSURE: 70 MMHG | WEIGHT: 304 LBS | BODY MASS INDEX: 41.63 KG/M2 | RESPIRATION RATE: 17 BRPM | HEART RATE: 66 BPM | TEMPERATURE: 98 F | HEIGHT: 71.5 IN

## 2018-01-27 DIAGNOSIS — G60.3 IDIOPATHIC PROGRESSIVE NEUROPATHY: ICD-10-CM

## 2018-01-27 DIAGNOSIS — K21.00 GASTROESOPHAGEAL REFLUX DISEASE WITH ESOPHAGITIS: Primary | ICD-10-CM

## 2018-01-27 DIAGNOSIS — F34.1 DYSTHYMIA: ICD-10-CM

## 2018-01-27 PROCEDURE — 99214 OFFICE O/P EST MOD 30 MIN: CPT | Performed by: FAMILY MEDICINE

## 2018-01-27 RX ORDER — DULOXETIN HYDROCHLORIDE 20 MG/1
20 CAPSULE, DELAYED RELEASE ORAL DAILY
Qty: 30 CAPSULE | Refills: 2 | Status: SHIPPED | OUTPATIENT
Start: 2018-01-27 | End: 2018-05-23

## 2018-01-27 RX ORDER — PANTOPRAZOLE SODIUM 40 MG/1
40 TABLET, DELAYED RELEASE ORAL
Qty: 90 TABLET | Refills: 3 | Status: SHIPPED | OUTPATIENT
Start: 2018-01-27 | End: 2018-12-21

## 2018-01-27 NOTE — PROGRESS NOTES
Chief Complaint:   Patient presents with:  Medication Follow-Up: discuss medication change      HPI:   This is a 65year old male coming in for follow-up visit regarding 2 issues: #1 chronic GERD including Dias's esophagus.     Patient with long history POLYPECTOMY 51072;  Surgeon: Farnaz Kan MD;  Location: [address] [place] [address]: KNEE REPLACEMENT SURGERY      Comment: left 2000 & 2008  No date: MELANOMA MONITOR PROFILE      Comment: Left ear  No date: OTHER      Comment: lymph Throat:  Denies hearing loss,or disturbance. Denies sore throat  INTEGUMENTARY:  Denies rashes, itching. CARDIOVASCULAR: Denies  chest discomfort, palpitations, edema,  RESPIRATORY: see HPI    Denies shortness of breath, wheezing, or sputum production. esophagitis      2. Idiopathic progressive neuropathy  3. Dysthymia  Trial of weaning down on duloxetine. Main complaint has been tiredness.         Meds & Refills for this Visit:  Signed Prescriptions Disp Refills    Pantoprazole Sodium 40 MG Oral Tab EC 9 Status post total right knee replacement     Ventral hernia     Gastroesophageal reflux disease with esophagitis     Dysthymia      JULES FLETCHER MD  1/27/2018  11:59 AM

## 2018-01-27 NOTE — PATIENT INSTRUCTIONS
Refill for pantoprazole sent     Wean duloxetine   Taking alternating 30 with 20 daily for 1 month   Then switch to 20 daily     Recheck with me in 2 months.

## 2018-03-05 ENCOUNTER — TELEPHONE (OUTPATIENT)
Dept: FAMILY MEDICINE CLINIC | Facility: CLINIC | Age: 63
End: 2018-03-05

## 2018-03-05 ENCOUNTER — OFFICE VISIT (OUTPATIENT)
Dept: FAMILY MEDICINE CLINIC | Facility: CLINIC | Age: 63
End: 2018-03-05

## 2018-03-05 VITALS
BODY MASS INDEX: 45 KG/M2 | OXYGEN SATURATION: 98 % | TEMPERATURE: 98 F | DIASTOLIC BLOOD PRESSURE: 86 MMHG | WEIGHT: 315 LBS | HEART RATE: 70 BPM | SYSTOLIC BLOOD PRESSURE: 134 MMHG

## 2018-03-05 DIAGNOSIS — M54.50 ACUTE BILATERAL LOW BACK PAIN WITHOUT SCIATICA: ICD-10-CM

## 2018-03-05 DIAGNOSIS — N39.0 RECURRENT UTI: Primary | ICD-10-CM

## 2018-03-05 LAB
BILIRUB UR QL STRIP.AUTO: NEGATIVE
BILIRUBIN: NEGATIVE
CLARITY UR REFRACT.AUTO: CLEAR
COLOR UR AUTO: YELLOW
GLUCOSE (URINE DIPSTICK): NEGATIVE MG/DL
GLUCOSE UR STRIP.AUTO-MCNC: NEGATIVE MG/DL
KETONES (URINE DIPSTICK): NEGATIVE MG/DL
KETONES UR STRIP.AUTO-MCNC: NEGATIVE MG/DL
LEUKOCYTE ESTERASE UR QL STRIP.AUTO: NEGATIVE
LEUKOCYTES: NEGATIVE
MULTISTIX LOT#: NORMAL NUMERIC
NITRITE UR QL STRIP.AUTO: NEGATIVE
NITRITE, URINE: NEGATIVE
PH UR STRIP.AUTO: 6 [PH] (ref 4.5–8)
PH, URINE: 6 (ref 4.5–8)
PROT UR STRIP.AUTO-MCNC: NEGATIVE MG/DL
PROTEIN (URINE DIPSTICK): NEGATIVE MG/DL
RBC UR QL AUTO: NEGATIVE
SP GR UR STRIP.AUTO: 1.01 (ref 1–1.03)
SPECIFIC GRAVITY: 1.01 (ref 1–1.03)
URINE-COLOR: YELLOW
UROBILINOGEN UR STRIP.AUTO-MCNC: <2 MG/DL
UROBILINOGEN,SEMI-QN: 0.2 MG/DL (ref 0–1.9)

## 2018-03-05 PROCEDURE — 99214 OFFICE O/P EST MOD 30 MIN: CPT | Performed by: NURSE PRACTITIONER

## 2018-03-05 PROCEDURE — 81001 URINALYSIS AUTO W/SCOPE: CPT | Performed by: NURSE PRACTITIONER

## 2018-03-05 PROCEDURE — 81003 URINALYSIS AUTO W/O SCOPE: CPT | Performed by: NURSE PRACTITIONER

## 2018-03-05 RX ORDER — CYCLOBENZAPRINE HCL 10 MG
10 TABLET ORAL NIGHTLY
Qty: 30 TABLET | Refills: 1 | Status: SHIPPED | OUTPATIENT
Start: 2018-03-05 | End: 2018-03-25

## 2018-03-05 RX ORDER — CELECOXIB 200 MG/1
200 CAPSULE ORAL 2 TIMES DAILY
Qty: 60 CAPSULE | Refills: 1 | Status: SHIPPED | OUTPATIENT
Start: 2018-03-05 | End: 2019-03-29 | Stop reason: ALTCHOICE

## 2018-03-05 NOTE — PROGRESS NOTES
Patient ID:   Leonor Stern  is a 59year old male    Allergies    Biaxin [Clarithromy*    Rash  Medications     celecoxib 200 MG Oral Cap Take 1 capsule (200 mg total) by mouth 2 (two) times daily.  Disp: 60 capsule Rfl: 1   Cyclobenzaprine HCl 10 MG Oral well-developed and well-nourished. No distress. Cardiovascular: Regular rate and rhythm no murmur. Pulmonary: Clear to auscultation bilaterally   Abdominal: Soft. Non tender, no guarding, no rebound, no masses. No hepatosplenomegaly.   No costovertebra

## 2018-03-05 NOTE — TELEPHONE ENCOUNTER
Chart reviewed     Last UA Dec 2017 - was normal     Last seen Jan 2018 with follow up appointment in April     Last physical mar 2017.      REcommend change f/u appointment to a physical and we can do repeat UA/ culture at that time

## 2018-03-05 NOTE — PATIENT INSTRUCTIONS
Take Celebrex for back pain     Muscle relaxant at bed time as needed     Heating pad      Will send urine out for culture-  We will let you know.      Follow up if symptoms persist or increase

## 2018-03-05 NOTE — TELEPHONE ENCOUNTER
Patient calling for UA/Culture order. Per last lab - \"Repeat UA  - all normalized .  No further blood or protein     Recommend recheck in future. \"     Please advise for lab order? Can call back patient to schedule appt if advised.

## 2018-03-05 NOTE — TELEPHONE ENCOUNTER
Called patient to inform him of this, states that he does not want to wait because he is having UTI symptoms. He has had many UTIs in the past. States he has low back pain, fowl smelling urine, fatigue - all sxs that he has experienced before.      Is wanti

## 2018-03-06 ENCOUNTER — TELEPHONE (OUTPATIENT)
Dept: FAMILY MEDICINE CLINIC | Facility: CLINIC | Age: 63
End: 2018-03-06

## 2018-03-06 ENCOUNTER — PRIOR ORIGINAL RECORDS (OUTPATIENT)
Dept: OTHER | Age: 63
End: 2018-03-06

## 2018-03-06 NOTE — TELEPHONE ENCOUNTER
----- Message from ANTONIA Espinoza sent at 3/6/2018  8:18 AM CST -----  Please notify patient that hurts his urine is normal, no infection. Patient to treat back pain as we discussed–follow-up if symptoms persist or increase.

## 2018-03-20 ENCOUNTER — PRIOR ORIGINAL RECORDS (OUTPATIENT)
Dept: OTHER | Age: 63
End: 2018-03-20

## 2018-04-03 ENCOUNTER — MYAURORA ACCOUNT LINK (OUTPATIENT)
Dept: OTHER | Age: 63
End: 2018-04-03

## 2018-04-23 ENCOUNTER — OFFICE VISIT (OUTPATIENT)
Dept: FAMILY MEDICINE CLINIC | Facility: CLINIC | Age: 63
End: 2018-04-23

## 2018-04-23 VITALS
TEMPERATURE: 98 F | WEIGHT: 315 LBS | DIASTOLIC BLOOD PRESSURE: 80 MMHG | BODY MASS INDEX: 43 KG/M2 | SYSTOLIC BLOOD PRESSURE: 132 MMHG | RESPIRATION RATE: 16 BRPM | HEART RATE: 56 BPM

## 2018-04-23 DIAGNOSIS — G60.3 IDIOPATHIC PROGRESSIVE NEUROPATHY: ICD-10-CM

## 2018-04-23 DIAGNOSIS — F34.1 DYSTHYMIA: ICD-10-CM

## 2018-04-23 DIAGNOSIS — K21.00 GASTROESOPHAGEAL REFLUX DISEASE WITH ESOPHAGITIS: Primary | ICD-10-CM

## 2018-04-23 PROCEDURE — 99214 OFFICE O/P EST MOD 30 MIN: CPT | Performed by: FAMILY MEDICINE

## 2018-04-23 NOTE — PROGRESS NOTES
Chief Complaint:   Patient presents with:  Medication Follow-Up      HPI:   This is a 59year old male coming in for follow-up care regarding his regular medications. Patient's tried weaning off of his duloxetine is now down to 20 mg daily.   Patient state (HCC)          right leg    •  PE (pulmonary embolism) (HCC)          on chronic coumadin    •  Esophageal reflux      •  Prostate cancer (HCC)      •  Muscle weakness (generalized)      •  Displacement of lumbar intervertebral disc without myelopathy    itching.     CARDIOVASCULAR: Denies  chest discomfort, palpitations, edema,  RESPIRATORY:  Denies shortness of breath, wheezing, cough or sputum production.     GASTROINTESTINAL: see HPI    Denies abdominal pain, nausea, vomiting, constipation, diarrhea    alternating days for 2 weeks or until gone. Continue with other regimen     Plan for physical / health check in Jan/ March          Patient/Caregiver Education: Patient/Caregiver Education: There are no barriers to learning. Medical education done.    O

## 2018-04-23 NOTE — PATIENT INSTRUCTIONS
Wean duloxetine - 20 mg alternating days for 2 weeks or until gone.      Continue with other regimen     Plan for physical / health check in Jan/ March

## 2018-05-09 ENCOUNTER — HOSPITAL (OUTPATIENT)
Dept: OTHER | Age: 63
End: 2018-05-09
Attending: INTERNAL MEDICINE

## 2018-05-09 LAB
ANALYZER ANC (IANC): NORMAL
ANION GAP SERPL CALC-SCNC: 13 MMOL/L (ref 10–20)
BUN SERPL-MCNC: 16 MG/DL (ref 6–20)
BUN/CREAT SERPL: 13 (ref 7–25)
CALCIUM SERPL-MCNC: 9.3 MG/DL (ref 8.4–10.2)
CHLORIDE: 104 MMOL/L (ref 98–107)
CO2 SERPL-SCNC: 29 MMOL/L (ref 21–32)
CREAT SERPL-MCNC: 1.25 MG/DL (ref 0.67–1.17)
ERYTHROCYTE [DISTWIDTH] IN BLOOD: 15 % (ref 11–15)
GLUCOSE SERPL-MCNC: 115 MG/DL (ref 65–99)
HEMATOCRIT: 44.6 % (ref 39–51)
HGB BLD-MCNC: 14.7 GM/DL (ref 13–17)
MCH RBC QN AUTO: 26.3 PG (ref 26–34)
MCHC RBC AUTO-ENTMCNC: 33 GM/DL (ref 32–36.5)
MCV RBC AUTO: 79.6 FL (ref 78–100)
PERCENT NRBC: NORMAL
PLATELET # BLD: 217 THOUSAND/MCL (ref 140–450)
POTASSIUM SERPL-SCNC: 3.9 MMOL/L (ref 3.4–5.1)
RBC # BLD: 5.6 MILLION/MCL (ref 4.5–5.9)
SODIUM SERPL-SCNC: 142 MMOL/L (ref 135–145)
WBC # BLD: 4.9 THOUSAND/MCL (ref 4.2–11)

## 2018-05-23 PROBLEM — I27.21 PAH (PULMONARY ARTERY HYPERTENSION) (HCC): Status: ACTIVE | Noted: 2018-05-23

## 2018-10-08 ENCOUNTER — TELEPHONE (OUTPATIENT)
Dept: FAMILY MEDICINE CLINIC | Facility: CLINIC | Age: 63
End: 2018-10-08

## 2018-10-08 NOTE — TELEPHONE ENCOUNTER
Patient was at the ER and was told to follow up with primary Dr. Patient wanted to see Dr Linda Tolentino tomorrow and I explained that Dr Linda Tolentino did not have an appointment available for tomorrow.  Patient said he could see Dr Linda Tolentino or Nuzhat but if there is no appointment

## 2018-12-21 PROBLEM — I50.32 CHRONIC DIASTOLIC CHF (CONGESTIVE HEART FAILURE) (HCC): Status: ACTIVE | Noted: 2018-12-21

## 2019-02-28 VITALS
DIASTOLIC BLOOD PRESSURE: 74 MMHG | HEIGHT: 71 IN | BODY MASS INDEX: 41.02 KG/M2 | SYSTOLIC BLOOD PRESSURE: 126 MMHG | WEIGHT: 293 LBS | HEART RATE: 73 BPM

## 2019-02-28 VITALS
HEIGHT: 72 IN | BODY MASS INDEX: 42.26 KG/M2 | DIASTOLIC BLOOD PRESSURE: 82 MMHG | HEART RATE: 64 BPM | WEIGHT: 312 LBS | SYSTOLIC BLOOD PRESSURE: 134 MMHG | RESPIRATION RATE: 16 BRPM

## 2019-03-01 VITALS
HEART RATE: 70 BPM | RESPIRATION RATE: 16 BRPM | SYSTOLIC BLOOD PRESSURE: 124 MMHG | BODY MASS INDEX: 40.6 KG/M2 | DIASTOLIC BLOOD PRESSURE: 74 MMHG | WEIGHT: 290 LBS | HEIGHT: 71 IN

## 2019-03-01 VITALS
WEIGHT: 302 LBS | HEART RATE: 74 BPM | BODY MASS INDEX: 42.28 KG/M2 | HEIGHT: 71 IN | SYSTOLIC BLOOD PRESSURE: 132 MMHG | DIASTOLIC BLOOD PRESSURE: 70 MMHG

## 2019-03-26 ENCOUNTER — TELEPHONE (OUTPATIENT)
Dept: FAMILY MEDICINE CLINIC | Facility: CLINIC | Age: 64
End: 2019-03-26

## 2019-03-26 PROBLEM — E11.9 TYPE 2 DIABETES MELLITUS WITHOUT COMPLICATION, WITHOUT LONG-TERM CURRENT USE OF INSULIN (HCC): Status: ACTIVE | Noted: 2019-03-26

## 2019-03-26 NOTE — TELEPHONE ENCOUNTER
Patient returned call and states he was advised by his cardiologist Dr. Denny Palmer to see his PCP regarding pre diabetes care. Patient [place] [facility] that showed elevated fasting glucose level and A1C. See care everywhere.      Patient is radha

## 2019-03-29 ENCOUNTER — APPOINTMENT (OUTPATIENT)
Dept: LAB | Age: 64
End: 2019-03-29
Attending: FAMILY MEDICINE
Payer: COMMERCIAL

## 2019-03-29 ENCOUNTER — OFFICE VISIT (OUTPATIENT)
Dept: FAMILY MEDICINE CLINIC | Facility: CLINIC | Age: 64
End: 2019-03-29
Payer: COMMERCIAL

## 2019-03-29 VITALS
SYSTOLIC BLOOD PRESSURE: 126 MMHG | OXYGEN SATURATION: 97 % | BODY MASS INDEX: 41.16 KG/M2 | TEMPERATURE: 98 F | WEIGHT: 294 LBS | HEIGHT: 71 IN | DIASTOLIC BLOOD PRESSURE: 68 MMHG | HEART RATE: 76 BPM | RESPIRATION RATE: 16 BRPM

## 2019-03-29 DIAGNOSIS — E11.9 TYPE 2 DIABETES MELLITUS WITHOUT COMPLICATION, WITHOUT LONG-TERM CURRENT USE OF INSULIN (HCC): ICD-10-CM

## 2019-03-29 DIAGNOSIS — E11.9 TYPE 2 DIABETES MELLITUS WITHOUT COMPLICATION, WITHOUT LONG-TERM CURRENT USE OF INSULIN (HCC): Primary | ICD-10-CM

## 2019-03-29 PROBLEM — Z98.890 S/P HERNIA REPAIR: Status: ACTIVE | Noted: 2018-09-25

## 2019-03-29 PROBLEM — R25.1 TREMOR OF BOTH HANDS: Status: ACTIVE | Noted: 2018-01-31

## 2019-03-29 PROBLEM — Z87.19 S/P HERNIA REPAIR: Status: ACTIVE | Noted: 2018-09-25

## 2019-03-29 LAB
ALBUMIN SERPL-MCNC: 4.5 G/DL (ref 3.4–5)
ALBUMIN/GLOB SERPL: 1.4 {RATIO} (ref 1–2)
ALP LIVER SERPL-CCNC: 60 U/L (ref 45–117)
ALT SERPL-CCNC: 35 U/L (ref 16–61)
ANION GAP SERPL CALC-SCNC: 7 MMOL/L (ref 0–18)
AST SERPL-CCNC: 24 U/L (ref 15–37)
BILIRUB SERPL-MCNC: 0.8 MG/DL (ref 0.1–2)
BUN BLD-MCNC: 24 MG/DL (ref 7–18)
BUN/CREAT SERPL: 15.9 (ref 10–20)
CALCIUM BLD-MCNC: 9.8 MG/DL (ref 8.5–10.1)
CHLORIDE SERPL-SCNC: 102 MMOL/L (ref 98–107)
CO2 SERPL-SCNC: 29 MMOL/L (ref 21–32)
CREAT BLD-MCNC: 1.51 MG/DL (ref 0.7–1.3)
EST. AVERAGE GLUCOSE BLD GHB EST-MCNC: 154 MG/DL (ref 68–126)
GLOBULIN PLAS-MCNC: 3.2 G/DL (ref 2.8–4.4)
GLUCOSE BLD-MCNC: 128 MG/DL (ref 70–99)
HBA1C MFR BLD HPLC: 7 % (ref ?–5.7)
M PROTEIN MFR SERPL ELPH: 7.7 G/DL (ref 6.4–8.2)
OSMOLALITY SERPL CALC.SUM OF ELEC: 292 MOSM/KG (ref 275–295)
POTASSIUM SERPL-SCNC: 3.7 MMOL/L (ref 3.5–5.1)
SODIUM SERPL-SCNC: 138 MMOL/L (ref 136–145)

## 2019-03-29 PROCEDURE — 83036 HEMOGLOBIN GLYCOSYLATED A1C: CPT | Performed by: FAMILY MEDICINE

## 2019-03-29 PROCEDURE — 36415 COLL VENOUS BLD VENIPUNCTURE: CPT | Performed by: FAMILY MEDICINE

## 2019-03-29 PROCEDURE — 99214 OFFICE O/P EST MOD 30 MIN: CPT | Performed by: FAMILY MEDICINE

## 2019-03-29 PROCEDURE — 80053 COMPREHEN METABOLIC PANEL: CPT | Performed by: FAMILY MEDICINE

## 2019-03-29 NOTE — PROGRESS NOTES
Chief Complaint:   Patient presents with: Follow - Up: Cardiologist referred patiet to PCP to check for pre diabetes      HPI:   This is a 62year old male coming in for review of labs.      Recent elevation of BSs and HAIC     Discussed diabetes in detail hyperlipidemia    • PE (pulmonary embolism)     on chronic coumadin   • Prostate cancer [facility])    • Prostate cancer (HCC)    • Rheumatism, unspecified and fibrositis    • Unspecified sleep apnea    • UTI (lower urinary tract infection) 1/15/2013          Pa tablet Rfl: 3   torsemide 20 MG Oral Tab Take 1 tablet (20 mg total) by mouth daily. Disp: 90 tablet Rfl: 3   Pantoprazole Sodium 40 MG Oral Tab EC Take 1 tablet (40 mg total) by mouth once daily.  Disp: 90 tablet Rfl: 3   XARELTO 20 MG Oral Tab Take 1 tabl male   , no apparent distress.   HEENT:  Head:  Normocephalic, atraumatic      Eyes: EOMI, PERRLA, no scleral icterus, conjunctivae clear bilaterally, no eye discharge   Ears: External normal.   Nose: clear nasal congestion      Throat:  No tonsillar eryth on CPAP     Essential tremor     E. coli UTI     Acute cystitis without hematuria     Personal history of pulmonary embolism     Osteoarthritis     Malignant tumor of prostate ([address]     Mixed hyperlipidemia     Migraine headache     Impotence     Herpes si

## 2019-04-01 ENCOUNTER — TELEPHONE (OUTPATIENT)
Dept: FAMILY MEDICINE CLINIC | Facility: CLINIC | Age: 64
End: 2019-04-01

## 2019-04-01 DIAGNOSIS — E11.9 TYPE 2 DIABETES MELLITUS WITHOUT COMPLICATION, WITHOUT LONG-TERM CURRENT USE OF INSULIN (HCC): Primary | ICD-10-CM

## 2019-04-01 NOTE — TELEPHONE ENCOUNTER
----- Message from Roxana Martell MD sent at 3/31/2019  4:37 PM CDT -----  Labs reviewed     HAIC is now higher - recommend referral to diabetes education center.      Recheck of labs and appointment with me in 3 months

## 2019-04-02 NOTE — TELEPHONE ENCOUNTER
Patient informed of the below and verbalized understanding. Referral to [facility] diabetes education center placed and patient given phone number to call. Patient will follow up for labs and appointment in 3 months.

## 2019-04-10 RX ORDER — LANCETS 33 GAUGE
1 EACH MISCELLANEOUS 2 TIMES DAILY
Qty: 200 EACH | Refills: 1 | Status: SHIPPED | OUTPATIENT
Start: 2019-04-10 | End: 2020-04-09

## 2019-04-10 RX ORDER — BLOOD-GLUCOSE METER
1 EACH MISCELLANEOUS 2 TIMES DAILY
Qty: 1 KIT | Refills: 0 | Status: SHIPPED | OUTPATIENT
Start: 2019-04-10 | End: 2020-04-09

## 2019-04-10 RX ORDER — BLOOD SUGAR DIAGNOSTIC
STRIP MISCELLANEOUS
Qty: 200 STRIP | Refills: 1 | Status: SHIPPED | OUTPATIENT
Start: 2019-04-10 | End: 2019-09-10

## 2019-04-10 NOTE — TELEPHONE ENCOUNTER
Please advise prescription for diabetic testing supplies to [facility] pharmacy. Future appt:     Your appointments     Date & Time Appointment Department [facility])    May 10, 2019 10:20 AM CDT EXAM-NEW PATIENT with Erica Lopez MD [facility]

## 2019-07-01 ENCOUNTER — OFFICE VISIT (OUTPATIENT)
Dept: FAMILY MEDICINE CLINIC | Facility: CLINIC | Age: 64
End: 2019-07-01
Payer: COMMERCIAL

## 2019-07-01 VITALS
WEIGHT: 283.19 LBS | HEIGHT: 71 IN | HEART RATE: 68 BPM | RESPIRATION RATE: 18 BRPM | OXYGEN SATURATION: 98 % | BODY MASS INDEX: 39.65 KG/M2 | TEMPERATURE: 98 F | SYSTOLIC BLOOD PRESSURE: 112 MMHG | DIASTOLIC BLOOD PRESSURE: 64 MMHG

## 2019-07-01 DIAGNOSIS — I10 BENIGN HYPERTENSION: ICD-10-CM

## 2019-07-01 DIAGNOSIS — E11.9 CONTROLLED TYPE 2 DIABETES MELLITUS WITHOUT COMPLICATION, WITHOUT LONG-TERM CURRENT USE OF INSULIN (HCC): Primary | ICD-10-CM

## 2019-07-01 PROCEDURE — 99214 OFFICE O/P EST MOD 30 MIN: CPT | Performed by: FAMILY MEDICINE

## 2019-07-01 NOTE — PATIENT INSTRUCTIONS
Obtain labs today       Continue with great work with healthy nutrition and stay active. Plan for recheck of labs and appointment in about 3 months.

## 2019-07-01 NOTE — PROGRESS NOTES
Chief Complaint:   Patient presents with:  Diabetes  Follow - Up: 3 Month f/u      HPI:   This is a 62year old male coming in for follow-up care. Patient with recent diagnosis of diabetes. Patient is gone to diabetes education.   He felt that this was us weakness (generalized)    • Myalgia and myositis, unspecified    • Obstructive sleep apnea (adult) (pediatric) [place] 2-20-19    on CPAP [employer]// CPAP 9 [employer]   • Other and unspecified hyperlipidemia    • PE (pulmonary embolism)     on chronic coumadin   •  . 4 children   Retired [employer]                      Current Meds:    Current Outpatient Medications:  ONETOUCH DELICA LANCETS 73J Does not apply Misc 1 lancet by Finger stick route 2 (two) times daily.  Disp: 200 each Rfl: 1   Blood Glu Left arm, Patient Position: Sitting, Cuff Size: large)   Pulse 68   Temp 98.1 °F (36.7 °C) (Temporal)   Resp 18   Ht 71\"   Wt 283 lb 3.2 oz   SpO2 98%   BMI 39.50 kg/m²  Estimated body mass index is 39.5 kg/m² as calculated from the following:    Height a any side effects or complications from the treatments as a result of today.      Problem List:  Patient Active Problem List:     Personal history of venous thrombosis and embolism     Peripheral neuropathy     DVT (deep venous thrombosis) (HCC)     Factor V

## 2019-07-02 LAB
APPEARANCE: CLEAR
BILIRUBIN: NEGATIVE
BLOOD: NEGATIVE
COLOR: YELLOW
CREATININE, URINE: 225.4 MG/DL
GLUCOSE BLOOD: NEGATIVE
HEMOGLOBIN A1C: 6.2
KETONES: NEGATIVE MMOL/L (ref 0–0.5)
LEUKOCYTE ESTERASE: NEGATIVE
MICROALBUMIN/CREATININE RATIO, RANDOM URINE: 3
MICROALBUMIN: 6.1
NITRITE: NEGATIVE
PH: 5.5
PROTEIN: NEGATIVE
SPECIFIC GRAVITY: 1.02
UROBILINOGEN: 0.2

## 2019-07-03 ENCOUNTER — TELEPHONE (OUTPATIENT)
Dept: FAMILY MEDICINE CLINIC | Facility: CLINIC | Age: 64
End: 2019-07-03

## 2019-09-11 RX ORDER — BLOOD SUGAR DIAGNOSTIC
STRIP MISCELLANEOUS
Qty: 200 STRIP | Refills: 0 | Status: SHIPPED | OUTPATIENT
Start: 2019-09-11

## 2019-09-11 NOTE — TELEPHONE ENCOUNTER
Future appt:     Your appointments     Date & Time Appointment Department [facility])    Oct 01, 2019  8:30 AM CDT Follow up - Extended with Crystal Banks MD [address], Rajiv Callahan ([facility])        Oct 02, 2019

## 2019-10-01 ENCOUNTER — APPOINTMENT (OUTPATIENT)
Dept: LAB | Age: 64
End: 2019-10-01
Attending: FAMILY MEDICINE
Payer: COMMERCIAL

## 2019-10-01 ENCOUNTER — OFFICE VISIT (OUTPATIENT)
Dept: FAMILY MEDICINE CLINIC | Facility: CLINIC | Age: 64
End: 2019-10-01
Payer: COMMERCIAL

## 2019-10-01 VITALS
SYSTOLIC BLOOD PRESSURE: 128 MMHG | WEIGHT: 286.63 LBS | HEART RATE: 62 BPM | TEMPERATURE: 99 F | BODY MASS INDEX: 40.13 KG/M2 | HEIGHT: 71 IN | DIASTOLIC BLOOD PRESSURE: 76 MMHG | RESPIRATION RATE: 16 BRPM | OXYGEN SATURATION: 98 %

## 2019-10-01 DIAGNOSIS — E11.9 TYPE 2 DIABETES MELLITUS WITHOUT COMPLICATION, WITHOUT LONG-TERM CURRENT USE OF INSULIN (HCC): Primary | ICD-10-CM

## 2019-10-01 DIAGNOSIS — G60.3 IDIOPATHIC PROGRESSIVE NEUROPATHY: ICD-10-CM

## 2019-10-01 PROCEDURE — 80053 COMPREHEN METABOLIC PANEL: CPT | Performed by: FAMILY MEDICINE

## 2019-10-01 PROCEDURE — 83036 HEMOGLOBIN GLYCOSYLATED A1C: CPT | Performed by: FAMILY MEDICINE

## 2019-10-01 PROCEDURE — 36415 COLL VENOUS BLD VENIPUNCTURE: CPT | Performed by: FAMILY MEDICINE

## 2019-10-01 PROCEDURE — 99214 OFFICE O/P EST MOD 30 MIN: CPT | Performed by: FAMILY MEDICINE

## 2019-10-01 PROCEDURE — 85025 COMPLETE CBC W/AUTO DIFF WBC: CPT | Performed by: FAMILY MEDICINE

## 2019-10-01 NOTE — PROGRESS NOTES
Chief Complaint:   Patient presents with:  Diabetes      HPI:   This is a 62year old male coming in for follow-up care. Patient with history for diet-controlled diabetes. He was seen at diabetes education center. Patient here today for recheck of labs. REPLACEMENT SURGERY  [facility]    left 2000 & 2008   • MELANOMA MONITOR PROFILE      Left ear   • OTHER      lymph node, left pelvis area   • OTHER SURGICAL HISTORY  [facility]     cervical fusion   • OTHER SURGICAL HISTORY  [facility]    bicep tendion rupture   • OTHER S Oral Tab Take 1 tablet (20 mg total) by mouth daily. Disp: 90 tablet Rfl: 3   Pantoprazole Sodium 40 MG Oral Tab EC Take 1 tablet (40 mg total) by mouth once daily. Disp: 90 tablet Rfl: 3   XARELTO 20 MG Oral Tab Take 1 tablet (20 mg total) by mouth daily. congestion      Throat:  No tonsillar erythema or exudate.    Mouth:  No oral lesions or ulcerations, good dentition.     NECK: Supple,  no JVD, no thyromegaly.   SKIN: No rashes, no skin lesion, no bruising, good turgor.     HEART:  Regular rate and rhythm cerebri ([facility] [address]     TRISTAN on CPAP     Essential tremor     E. coli UTI     Acute cystitis without hematuria     Personal history of pulmonary embolism     Osteoarthritis     Malignant tumor of prostate ([facility] [address]     Mixed hyperlipidemia     Migraine headache     I

## 2019-10-01 NOTE — PATIENT INSTRUCTIONS
Good exam     Obtain labs today     Keep up good work on regular activity       Recheck in 6 months for physical

## 2019-10-02 ENCOUNTER — TELEPHONE (OUTPATIENT)
Dept: FAMILY MEDICINE CLINIC | Facility: CLINIC | Age: 64
End: 2019-10-02

## 2019-10-02 NOTE — TELEPHONE ENCOUNTER
----- Message from Jaydon Vasquez MD sent at 10/2/2019  3:35 PM CDT -----  Labs reveiwed     HAIC 6. 4  - improved.  - improved. Recommend continue with good work with healthy nutrition and staying active. CBC , chem profile - good.

## 2019-11-21 ENCOUNTER — OFFICE VISIT (OUTPATIENT)
Dept: FAMILY MEDICINE CLINIC | Facility: CLINIC | Age: 64
End: 2019-11-21
Payer: COMMERCIAL

## 2019-11-21 ENCOUNTER — TELEPHONE (OUTPATIENT)
Dept: FAMILY MEDICINE CLINIC | Facility: CLINIC | Age: 64
End: 2019-11-21

## 2019-11-21 ENCOUNTER — APPOINTMENT (OUTPATIENT)
Dept: LAB | Age: 64
End: 2019-11-21
Attending: NURSE PRACTITIONER
Payer: COMMERCIAL

## 2019-11-21 VITALS
TEMPERATURE: 98 F | HEART RATE: 82 BPM | DIASTOLIC BLOOD PRESSURE: 70 MMHG | WEIGHT: 274.38 LBS | SYSTOLIC BLOOD PRESSURE: 118 MMHG | RESPIRATION RATE: 16 BRPM | BODY MASS INDEX: 38.41 KG/M2 | HEIGHT: 71 IN | OXYGEN SATURATION: 97 %

## 2019-11-21 DIAGNOSIS — Z87.19 S/P HERNIA SURGERY: ICD-10-CM

## 2019-11-21 DIAGNOSIS — Z98.890 S/P HERNIA SURGERY: ICD-10-CM

## 2019-11-21 DIAGNOSIS — R50.9 FEVER, UNSPECIFIED FEVER CAUSE: Primary | ICD-10-CM

## 2019-11-21 DIAGNOSIS — R10.84 GENERALIZED ABDOMINAL PAIN: ICD-10-CM

## 2019-11-21 PROCEDURE — 85025 COMPLETE CBC W/AUTO DIFF WBC: CPT | Performed by: NURSE PRACTITIONER

## 2019-11-21 PROCEDURE — 81001 URINALYSIS AUTO W/SCOPE: CPT | Performed by: NURSE PRACTITIONER

## 2019-11-21 PROCEDURE — 87086 URINE CULTURE/COLONY COUNT: CPT | Performed by: NURSE PRACTITIONER

## 2019-11-21 PROCEDURE — 99215 OFFICE O/P EST HI 40 MIN: CPT | Performed by: NURSE PRACTITIONER

## 2019-11-21 PROCEDURE — 80053 COMPREHEN METABOLIC PANEL: CPT | Performed by: NURSE PRACTITIONER

## 2019-11-21 PROCEDURE — 36415 COLL VENOUS BLD VENIPUNCTURE: CPT | Performed by: NURSE PRACTITIONER

## 2019-11-21 RX ORDER — CIPROFLOXACIN 500 MG/1
500 TABLET, FILM COATED ORAL 2 TIMES DAILY
COMMUNITY
Start: 2019-11-18 | End: 2019-11-25

## 2019-11-21 NOTE — PROGRESS NOTES
Temo Maravilla is a 62year old male.   Patient presents with:  Fever: 8 days   Drainage: PT says about 1 gallon of fluid out of the surgical sites      HPI:   Complaints of ventral hernia repair on November 4 - [facility] - Dr. Rianna Benoit   Extensive surgery- [place] hematuria     Personal history of pulmonary embolism     Osteoarthritis     Malignant tumor of prostate ([facility] [address]     Mixed hyperlipidemia     Migraine headache     Impotence     Herpes simplex virus (HSV) infection     Deep vein thrombosis (DVT) of right lowe ([facility] 2/14/2014   • Muscle weakness (generalized)    • Myalgia and myositis, unspecified    • Obstructive sleep apnea (adult) (pediatric) TX 2-20-19    on CPAP [employer]// CPAP 9 [employer]   • Other and unspecified hyperlipidemia    • PE (pulmonary embolism) incision as above–no induration–abdomen is soft, no guarding, no rebound tenderness, negative psoas, no CVA tenderness. Normal bowel sounds.   MUSCULOSKELETAL: no edema, normal strength and tone  PSYCH: alert and oriented x 3 well-groomed, good eye contact

## 2019-11-21 NOTE — TELEPHONE ENCOUNTER
pt was seen this morning and was told to call office if fever reached 100.3- spouse wants to talk to nurse

## 2019-11-21 NOTE — PATIENT INSTRUCTIONS
Check blood work today and urinalysis    Monitor temperature - call if temp > 100.3     Rest, fluids,     Follow up if symptoms persist or increase

## 2019-11-21 NOTE — TELEPHONE ENCOUNTER
Wife called stating they were instructed to call if pt's fever reached 100.3. Liam Denis called stating his fever is 100.3 with chills and pt does not feel good. Per Jodie Sloan, pt to go to ER. Pt will go to [address] were the surgeon is at.

## 2019-11-23 ENCOUNTER — TELEPHONE (OUTPATIENT)
Dept: FAMILY MEDICINE CLINIC | Facility: CLINIC | Age: 64
End: 2019-11-23

## 2019-11-23 NOTE — TELEPHONE ENCOUNTER
----- Message from JUDITH Chiu sent at 11/22/2019 11:06 PM CST -----  Your urinalysis grew a small amount of bacteria–recommend finishing the Cipro  Your CBC shows that you are slightly anemic at 12.8 (normal is 13–17 0.5) is likely just due to s

## 2019-12-07 ENCOUNTER — TELEPHONE (OUTPATIENT)
Dept: FAMILY MEDICINE CLINIC | Facility: CLINIC | Age: 64
End: 2019-12-07

## 2019-12-07 ENCOUNTER — LABORATORY ENCOUNTER (OUTPATIENT)
Dept: LAB | Age: 64
End: 2019-12-07
Attending: FAMILY MEDICINE
Payer: COMMERCIAL

## 2019-12-07 DIAGNOSIS — R50.9 FEVER, UNSPECIFIED: Primary | ICD-10-CM

## 2019-12-07 PROCEDURE — 81001 URINALYSIS AUTO W/SCOPE: CPT

## 2019-12-07 NOTE — TELEPHONE ENCOUNTER
pt has lab this morning. been sick since he had surgery. has a fever now everyday and has a sore on tongue and throat hurts .  no openings today

## 2019-12-07 NOTE — TELEPHONE ENCOUNTER
Patient had hernia repair surgery on 11/4/2019. Patient was seen 11/21/19 by Sophie Graves after having a fever for 8 days prior. Patient called office later that day with a high fever reading and was advised to go to ER.      Patient called his surgeon's offi

## 2019-12-11 ENCOUNTER — TELEPHONE (OUTPATIENT)
Dept: FAMILY MEDICINE CLINIC | Facility: CLINIC | Age: 64
End: 2019-12-11

## 2019-12-11 NOTE — TELEPHONE ENCOUNTER
Recommend continue with antibiotic as advised by surgeon. Unless directed otherwise by surgeon - recommend recheck of UA in 2 weeks. Recheck for blood. UA reviewed again - negative for WBC, BActeria , leuk esterase and nitrate.  Unlikely that that spe

## 2019-12-11 NOTE — TELEPHONE ENCOUNTER
I spoke with pt after he called back and was yelling at [facility], Dr. Mireya Gary nurse. I tried to explain to him just as [facility] had that the order that we received was for a Urinalysis w/Microscopic, reflex Culture.  The order did have a comment at the td

## 2019-12-11 NOTE — TELEPHONE ENCOUNTER
Informed patient of the below. Patient continued to argue that his surgeon wanted a urine culture done no matter the urinalysis result.      I explained to patient that in 12/6/19 telephone encounter with his office I can see the orders placed and that a ur

## 2019-12-11 NOTE — TELEPHONE ENCOUNTER
----- Message from Nghia Gilman MD sent at 12/10/2019  7:48 PM CST -----  UA result reviewed   Moderate blood. Patient with history of urinary retention post surgery - recommend reconnect with surgeon. May need to see urology.   No sign of infectio

## 2019-12-11 NOTE — TELEPHONE ENCOUNTER
Patient informed of the below results and recommendations. Patient will contact his surgeon. Patient states the order Dr. Erica Garces sent over was for urinalysis and urine culture.      States Dr. Erica Garces told patient he will do a urine culture no ma

## 2020-11-18 ENCOUNTER — TELEPHONE (OUTPATIENT)
Dept: FAMILY MEDICINE CLINIC | Facility: CLINIC | Age: 65
End: 2020-11-18

## 2020-11-18 DIAGNOSIS — Z00.00 WELLNESS EXAMINATION: ICD-10-CM

## 2020-11-18 DIAGNOSIS — G60.3 IDIOPATHIC PROGRESSIVE NEUROPATHY: ICD-10-CM

## 2020-11-18 DIAGNOSIS — E11.9 TYPE 2 DIABETES MELLITUS WITHOUT COMPLICATION, WITHOUT LONG-TERM CURRENT USE OF INSULIN (HCC): Primary | ICD-10-CM

## 2020-11-18 DIAGNOSIS — Z13.220 LIPID SCREENING: ICD-10-CM

## 2020-11-18 DIAGNOSIS — Z12.5 SCREENING PSA (PROSTATE SPECIFIC ANTIGEN): ICD-10-CM

## 2020-11-18 DIAGNOSIS — I10 BENIGN HYPERTENSION: ICD-10-CM

## 2020-11-18 NOTE — TELEPHONE ENCOUNTER
Please enter lab order in chart for up coming appointment - Please fax lab orders to [facility] - Please call patient when done so patient can call to schedule.

## 2020-11-18 NOTE — TELEPHONE ENCOUNTER
Last appt with TR:  10/1/19    Pt had labs 10/1/19 and 11/21/19  Also outside provider orders on file. Please advise.     Future Appointments   Date Time Provider Serjio Talbot   12/7/2020  4:00 PM Lorrie Chin MD [facility]   3/8

## 2020-11-23 ENCOUNTER — TELEPHONE (OUTPATIENT)
Dept: FAMILY MEDICINE CLINIC | Facility: CLINIC | Age: 65
End: 2020-11-23

## 2020-11-23 NOTE — TELEPHONE ENCOUNTER
Patient states that suad never received his lab orders that were faxed on 11/18/20, patient would like nurse/ Dr to fax them again.

## 2020-11-24 NOTE — TELEPHONE ENCOUNTER
Lab orders faxed to [facility] patient scheduling. Received success fax confirmation.      Informed patient of this and patient started complaining about how this should have been done last Friday and now he will have to get labs done on Thanksgiving, etc.     In

## 2020-12-10 ENCOUNTER — TELEPHONE (OUTPATIENT)
Dept: FAMILY MEDICINE CLINIC | Facility: CLINIC | Age: 65
End: 2020-12-10

## 2020-12-10 NOTE — TELEPHONE ENCOUNTER
Dr. Valentine Hewitt is out of the office today. Note that we did receive his lab results from [address] [place] [address] are basically stable, except it does show that there is a slight urinary tract infection.   Recommend to treat with Keflex 250 mg 3 times daily

## 2020-12-11 RX ORDER — CEPHALEXIN 250 MG/1
250 CAPSULE ORAL 3 TIMES DAILY
Qty: 21 CAPSULE | Refills: 0 | Status: CANCELLED | OUTPATIENT
Start: 2020-12-11 | End: 2020-12-18

## 2020-12-11 NOTE — TELEPHONE ENCOUNTER
LM for patient to return call. Dr. Tyree Burdick, can we send script for patient in case we do not get ahold of him today? Maybe the pharmacy will reach him over the weekend?

## 2020-12-11 NOTE — TELEPHONE ENCOUNTER
Chart reviewed / Care Everywhere reviewed   Patient established care with dr. Miky Cannon 12/10 /20 -   PCP in Epic changed.

## 2021-03-04 ENCOUNTER — OFFICE VISIT (OUTPATIENT)
Dept: SURGERY | Facility: CLINIC | Age: 66
End: 2021-03-04
Payer: MEDICARE

## 2021-03-04 VITALS
DIASTOLIC BLOOD PRESSURE: 73 MMHG | SYSTOLIC BLOOD PRESSURE: 121 MMHG | HEIGHT: 71 IN | HEART RATE: 102 BPM | BODY MASS INDEX: 39.2 KG/M2 | TEMPERATURE: 98 F | WEIGHT: 280 LBS

## 2021-03-04 DIAGNOSIS — G47.33 OSA ON CPAP: ICD-10-CM

## 2021-03-04 DIAGNOSIS — Z87.19 S/P HERNIA REPAIR: ICD-10-CM

## 2021-03-04 DIAGNOSIS — E11.9 TYPE 2 DIABETES MELLITUS WITHOUT COMPLICATION, WITHOUT LONG-TERM CURRENT USE OF INSULIN (HCC): ICD-10-CM

## 2021-03-04 DIAGNOSIS — Z79.01 CHRONIC ANTICOAGULATION: ICD-10-CM

## 2021-03-04 DIAGNOSIS — K59.09 OTHER CONSTIPATION: ICD-10-CM

## 2021-03-04 DIAGNOSIS — Z98.890 S/P HERNIA REPAIR: ICD-10-CM

## 2021-03-04 DIAGNOSIS — Z99.89 OSA ON CPAP: ICD-10-CM

## 2021-03-04 DIAGNOSIS — K21.00 GASTROESOPHAGEAL REFLUX DISEASE WITH ESOPHAGITIS WITHOUT HEMORRHAGE: ICD-10-CM

## 2021-03-04 DIAGNOSIS — Z85.46 HISTORY OF PROSTATE CANCER: ICD-10-CM

## 2021-03-04 DIAGNOSIS — R13.19 OTHER DYSPHAGIA: Primary | ICD-10-CM

## 2021-03-04 DIAGNOSIS — D68.2 FACTOR V DEFICIENCY (HCC): ICD-10-CM

## 2021-03-04 DIAGNOSIS — Z80.0 FAMILY HISTORY OF ESOPHAGEAL CANCER: ICD-10-CM

## 2021-03-04 PROCEDURE — 3078F DIAST BP <80 MM HG: CPT | Performed by: COLON & RECTAL SURGERY

## 2021-03-04 PROCEDURE — 3008F BODY MASS INDEX DOCD: CPT | Performed by: COLON & RECTAL SURGERY

## 2021-03-04 PROCEDURE — 3074F SYST BP LT 130 MM HG: CPT | Performed by: COLON & RECTAL SURGERY

## 2021-03-04 PROCEDURE — 99205 OFFICE O/P NEW HI 60 MIN: CPT | Performed by: COLON & RECTAL SURGERY

## 2021-03-04 NOTE — PATIENT INSTRUCTIONS
The patient presents today in consultation for 2 separate issues involving the gastrointestinal tract. The patient's first issue that he presents for today is difficulty swallowing.   He states that over the last 3 to 4 months he has noted that he has ha patient does have very significant past medical history. The patient does have pulmonary hypertension as well as factor V Leiden. He follows with Dr. Malia Humphreys as his cardiologist.  He is on Xarelto.   He has had multiple DVTs and PEs in the past.  The patien performed. The patient is to get this test done at [facility] and follow-up in our office on March 15 so we can review the results and make further medical decisions at that time.   Depending on the results of this study, he will likely need a future

## 2021-03-04 NOTE — H&P
New Patient Visit Note       Active Problems      1. Other dysphagia    2. Other constipation    3. Gastroesophageal reflux disease with esophagitis without hemorrhage    4. Family history of esophageal cancer, brother at 57    9.  Type 2 diabetes mellitus MiraLAX in the past with minimal improvement. He states that he took it every other day for short while. He states that he was afraid to take it for too long as he has limited oral intake secondary to his pulmonary hypertension.     The patient denies hav performed a physical exam, and developed an assessment and plan. The physician performed all medical decision making.     Rama Scales PA-C    I agree with the note as scribed by the PA  I performed my own physical exam and obtained the history as deta umbilicus, there is rectus abdominous diastasis of at least 11 cm where small bowel and fat protrude through diastasis which could represent a [place] recurrent hernia versus normal laxity of the abdominal wall. IMPRESSION:  1.  Prior mesh hernia repai SURGICAL HISTORY  [facility]    bicep tendion rupture   • OTHER SURGICAL HISTORY  [facility]     bicep tendon rupture   • OTHER SURGICAL HISTORY      rigth carpal tunnel release   • OTHER SURGICAL HISTORY  [facility] 2009     prostatectomy for cancer    • OTHER SURGICAL In Vitro Strip, USE AS DIRECTED, Disp: 200 strip, Rfl: 0  •  MULTIVITAMIN OR, one tablet daily, Disp: , Rfl:       Review of Systems  The Review of Systems has been reviewed by me during today.   Review of Systems   Constitutional: Negative for chills, diap appearance and bowel sounds are normal. He exhibits no shifting dullness, no distension, no fluid wave, no ascites and no mass. There is no hepatosplenomegaly. There is no abdominal tenderness. There is no rigidity, no rebound and no guarding. No hernia.  H has noted that he has had more difficulty swallowing. He states that he feels food is getting caught on the way down and his esophagus. He states that this happens more with solid foods.   He states he has not noted any specific foods themselves that caus in the past.  The patient also has a past medical history of obstructive sleep apnea and noninsulin-dependent type 2 diabetes. The patient did have prostate cancer in 2007 where he underwent an open radical prostatectomy.   He did not receive any chemoth will likely need a future EGD as well. However, further surgical/medical decision making will be deferred to his next appointment. I discussed that the patient will need an eventual colonoscopy as well to evaluate his change in bowel habits.   We will s

## 2021-03-10 ENCOUNTER — TELEPHONE (OUTPATIENT)
Dept: SURGERY | Facility: CLINIC | Age: 66
End: 2021-03-10

## 2021-03-10 NOTE — TELEPHONE ENCOUNTER
Pt to have UGI and has Ngative covid test from [facility] - faxed result to radiology.

## 2021-03-12 ENCOUNTER — HOSPITAL ENCOUNTER (OUTPATIENT)
Dept: GENERAL RADIOLOGY | Facility: HOSPITAL | Age: 66
Discharge: HOME | End: 2021-03-12
Attending: COLON & RECTAL SURGERY
Payer: MEDICARE

## 2021-03-12 DIAGNOSIS — R13.19 OTHER DYSPHAGIA: ICD-10-CM

## 2021-03-12 PROCEDURE — 74246 X-RAY XM UPR GI TRC 2CNTRST: CPT | Performed by: COLON & RECTAL SURGERY

## 2021-03-18 ENCOUNTER — OFFICE VISIT (OUTPATIENT)
Dept: SURGERY | Facility: CLINIC | Age: 66
End: 2021-03-18
Payer: MEDICARE

## 2021-03-18 VITALS
WEIGHT: 290 LBS | BODY MASS INDEX: 40.6 KG/M2 | DIASTOLIC BLOOD PRESSURE: 94 MMHG | TEMPERATURE: 97 F | HEIGHT: 71 IN | SYSTOLIC BLOOD PRESSURE: 151 MMHG | HEART RATE: 80 BPM

## 2021-03-18 DIAGNOSIS — Z80.0 FAMILY HISTORY OF ESOPHAGEAL CANCER: ICD-10-CM

## 2021-03-18 DIAGNOSIS — Z85.46 HISTORY OF PROSTATE CANCER: ICD-10-CM

## 2021-03-18 DIAGNOSIS — E11.9 TYPE 2 DIABETES MELLITUS WITHOUT COMPLICATION, WITHOUT LONG-TERM CURRENT USE OF INSULIN (HCC): ICD-10-CM

## 2021-03-18 DIAGNOSIS — R13.19 OTHER DYSPHAGIA: Primary | ICD-10-CM

## 2021-03-18 DIAGNOSIS — K59.09 OTHER CONSTIPATION: ICD-10-CM

## 2021-03-18 DIAGNOSIS — K21.00 GASTROESOPHAGEAL REFLUX DISEASE WITH ESOPHAGITIS WITHOUT HEMORRHAGE: ICD-10-CM

## 2021-03-18 PROCEDURE — 99213 OFFICE O/P EST LOW 20 MIN: CPT | Performed by: COLON & RECTAL SURGERY

## 2021-03-18 PROCEDURE — 3080F DIAST BP >= 90 MM HG: CPT | Performed by: COLON & RECTAL SURGERY

## 2021-03-18 PROCEDURE — 3008F BODY MASS INDEX DOCD: CPT | Performed by: COLON & RECTAL SURGERY

## 2021-03-18 PROCEDURE — 3077F SYST BP >= 140 MM HG: CPT | Performed by: COLON & RECTAL SURGERY

## 2021-03-18 NOTE — PATIENT INSTRUCTIONS
The patient presents today for continued care and evaluation regarding his dysphagia. The patient states that his symptoms are about the same as his last visit. He states that he still occasionally feels that food gets stuck as he is swallowing.   We se

## 2021-03-18 NOTE — PROGRESS NOTES
Follow Up Visit Note       Active Problems      1. Other dysphagia    2. Other constipation    3. Gastroesophageal reflux disease with esophagitis without hemorrhage    4.  Type 2 diabetes mellitus without complication, without long-term current use of insu tests, referring and communicating with other healthcare professionals, documenting clinical information, independently interpreting results, coordinating care, and communication of any results that were available at today's visit.       PROCEDURE:  XR UGI Finalized by (CST): Minnie Rodriguez MD on 3/12/2021 at 9:06 AM        Olamide Powell is allergic to biaxin [clarithromycin].     Past Medical / Surgical / Social / Family History    The past medical and past surgical history have been reviewed by me today Spouse name: Not on file      Number of children: 4      Years of education: Not on file      Highest education level: Not on file    Tobacco Use      Smoking status: Never Smoker      Smokeless tobacco: Never Used    Substance and Sexual Activity      Alc Disp: 90 tablet, Rfl: 3  •  losartan 100 MG Oral Tab, Take 1 tablet (100 mg total) by mouth daily. , Disp: 90 tablet, Rfl: 3  •  torsemide 20 MG Oral Tab, Take 1 tablet (20 mg total) by mouth as needed. , Disp: 90 tablet, Rfl: 3  •  Fluticasone Propionate 50 General: No scleral icterus. Conjunctiva/sclera: Conjunctivae normal.      Pupils: Pupils are equal, round, and reactive to light. Neck:      Trachea: No tracheal deviation. Cardiovascular:      Rate and Rhythm: Normal rate and regular rhythm. cancer  Family history of esophageal cancer, brother at 58    Plan   The patient presents today for continued care and evaluation regarding his dysphagia. The patient states that his symptoms are about the same as his last visit.   He states that he stil Referrals   None    Follow Up  Return if symptoms worsen or fail to improve.     Adriano Hernandez MD

## 2021-03-19 PROBLEM — I77.1 TORTUOUS AORTA (HCC): Status: ACTIVE | Noted: 2021-03-19

## 2021-04-29 DIAGNOSIS — Z01.812 PRE-PROCEDURE LAB EXAM: Primary | ICD-10-CM

## 2021-05-04 RX ORDER — PANTOPRAZOLE SODIUM 40 MG/1
40 TABLET, DELAYED RELEASE ORAL DAILY
COMMUNITY
Start: 2013-06-14

## 2021-05-04 RX ORDER — LOSARTAN POTASSIUM 100 MG/1
100 TABLET ORAL EVERY EVENING
COMMUNITY

## 2021-05-04 RX ORDER — ASPIRIN 81 MG/1
81 TABLET, CHEWABLE ORAL DAILY
COMMUNITY

## 2021-05-04 RX ORDER — DILTIAZEM HYDROCHLORIDE 120 MG/1
240 TABLET, FILM COATED ORAL DAILY
COMMUNITY

## 2021-05-04 RX ORDER — ROSUVASTATIN CALCIUM 40 MG/1
40 TABLET, COATED ORAL DAILY
COMMUNITY
Start: 2011-10-04

## 2021-05-04 RX ORDER — TORSEMIDE 20 MG/1
20 TABLET ORAL DAILY PRN
COMMUNITY

## 2021-05-06 ENCOUNTER — HOSPITAL ENCOUNTER (OUTPATIENT)
Dept: CARDIOLOGY | Age: 66
Discharge: HOME | End: 2021-05-06
Attending: INTERNAL MEDICINE

## 2021-05-06 VITALS
HEART RATE: 54 BPM | RESPIRATION RATE: 16 BRPM | BODY MASS INDEX: 41.76 KG/M2 | WEIGHT: 298.28 LBS | HEIGHT: 71 IN | SYSTOLIC BLOOD PRESSURE: 116 MMHG | DIASTOLIC BLOOD PRESSURE: 72 MMHG

## 2021-05-06 DIAGNOSIS — I48.91 ATRIAL FIBRILLATION, UNSPECIFIED TYPE (CMD): ICD-10-CM

## 2021-05-06 LAB
25(OH)D3+25(OH)D2 SERPL-MCNC: 29.3 NG/ML (ref 30–100)
ANION GAP SERPL CALC-SCNC: 4 MMOL/L (ref 10–20)
ATRIAL RATE (BPM): 52
BUN SERPL-MCNC: 15 MG/DL (ref 6–20)
BUN/CREAT SERPL: 12 (ref 7–25)
CALCIUM SERPL-MCNC: 9.3 MG/DL (ref 8.4–10.2)
CHLORIDE SERPL-SCNC: 108 MMOL/L (ref 98–107)
CO2 SERPL-SCNC: 31 MMOL/L (ref 21–32)
CREAT SERPL-MCNC: 1.28 MG/DL (ref 0.67–1.17)
FASTING DURATION TIME PATIENT: ABNORMAL H
GFR SERPLBLD BASED ON 1.73 SQ M-ARVRAT: 58 ML/MIN/1.73M2
GLUCOSE SERPL-MCNC: 110 MG/DL (ref 65–99)
HBA1C MFR BLD: 6.4 % (ref 4.5–5.6)
MAGNESIUM SERPL-MCNC: 2.2 MG/DL (ref 1.7–2.4)
P AXIS (DEGREES): 35
POTASSIUM SERPL-SCNC: 4.2 MMOL/L (ref 3.4–5.1)
PR-INTERVAL (MSEC): 250
QRS-INTERVAL (MSEC): 102
QT-INTERVAL (MSEC): 446
QTC: 415
R AXIS (DEGREES): -22
REPORT TEXT: NORMAL
SARS-COV-2 RNA RESP QL NAA+PROBE: NOT DETECTED
SERVICE CMNT-IMP: NORMAL
SERVICE CMNT-IMP: NORMAL
SODIUM SERPL-SCNC: 139 MMOL/L (ref 135–145)
T AXIS (DEGREES): 2
VENTRICULAR RATE EKG/MIN (BPM): 52

## 2021-05-06 PROCEDURE — 92960 CARDIOVERSION ELECTRIC EXT: CPT

## 2021-05-06 PROCEDURE — 10002801 HB RX 250 W/O HCPCS: Performed by: INTERNAL MEDICINE

## 2021-05-06 PROCEDURE — 83735 ASSAY OF MAGNESIUM: CPT | Performed by: INTERNAL MEDICINE

## 2021-05-06 PROCEDURE — 80048 BASIC METABOLIC PNL TOTAL CA: CPT | Performed by: INTERNAL MEDICINE

## 2021-05-06 PROCEDURE — 87635 SARS-COV-2 COVID-19 AMP PRB: CPT | Performed by: INTERNAL MEDICINE

## 2021-05-06 PROCEDURE — 83036 HEMOGLOBIN GLYCOSYLATED A1C: CPT | Performed by: INTERNAL MEDICINE

## 2021-05-06 PROCEDURE — 93005 ELECTROCARDIOGRAM TRACING: CPT | Performed by: INTERNAL MEDICINE

## 2021-05-06 PROCEDURE — 13000001 HB PHASE II RECOVERY EA 30 MINUTES

## 2021-05-06 PROCEDURE — 36415 COLL VENOUS BLD VENIPUNCTURE: CPT | Performed by: INTERNAL MEDICINE

## 2021-05-06 PROCEDURE — 82306 VITAMIN D 25 HYDROXY: CPT | Performed by: INTERNAL MEDICINE

## 2021-05-06 RX ORDER — 0.9 % SODIUM CHLORIDE 0.9 %
2 VIAL (ML) INJECTION EVERY 12 HOURS SCHEDULED
Status: DISCONTINUED | OUTPATIENT
Start: 2021-05-06 | End: 2021-05-07 | Stop reason: HOSPADM

## 2021-05-06 RX ORDER — SODIUM CHLORIDE 9 MG/ML
INJECTION, SOLUTION INTRAVENOUS
Status: DISPENSED
Start: 2021-05-06 | End: 2021-05-06

## 2021-05-06 RX ORDER — METHOHEXITAL IN WATER/PF 100MG/10ML
SYRINGE (ML) INTRAVENOUS PRN
Status: COMPLETED | OUTPATIENT
Start: 2021-05-06 | End: 2021-05-06

## 2021-05-06 RX ORDER — METHOHEXITAL IN WATER/PF 100MG/10ML
100 SYRINGE (ML) INTRAVENOUS ONCE
Status: DISCONTINUED | OUTPATIENT
Start: 2021-05-06 | End: 2021-05-07 | Stop reason: HOSPADM

## 2021-05-06 RX ADMIN — Medication 80 MG: at 10:52

## 2021-05-06 ASSESSMENT — PAIN SCALES - GENERAL: PAINLEVEL_OUTOF10: 0

## 2021-10-09 NOTE — TELEPHONE ENCOUNTER
Left message for patient to call office back. Also reviewed care everywhere more in depth. See telephone call from 12/6/19. Order for urinalysis with microscopic, reflex culture was placed.      Therefore,  entered order as provider ordere No

## 2022-08-10 NOTE — TELEPHONE ENCOUNTER
----- Message from Alida Joshi MD sent at 7/2/2019  8:05 PM CDT -----  Labs reviewed     HAIC - 6. 2 - good improvement     Urinalysis normal Bcc Histology Text: There were numerous aggregates of basaloid cells.

## 2023-01-24 ENCOUNTER — HOSPITAL ENCOUNTER (OUTPATIENT)
Dept: CARDIOLOGY | Age: 68
End: 2023-01-24
Attending: INTERNAL MEDICINE

## 2023-01-30 RX ORDER — LATANOPROST 50 UG/ML
1 SOLUTION/ DROPS OPHTHALMIC NIGHTLY
COMMUNITY

## 2023-01-31 ENCOUNTER — HOSPITAL ENCOUNTER (OUTPATIENT)
Dept: CARDIOLOGY | Age: 68
Discharge: HOME | End: 2023-01-31
Attending: INTERNAL MEDICINE

## 2023-01-31 VITALS
SYSTOLIC BLOOD PRESSURE: 122 MMHG | BODY MASS INDEX: 41.17 KG/M2 | WEIGHT: 294.09 LBS | RESPIRATION RATE: 16 BRPM | HEIGHT: 71 IN | TEMPERATURE: 96.2 F | HEART RATE: 85 BPM | DIASTOLIC BLOOD PRESSURE: 84 MMHG | OXYGEN SATURATION: 99 %

## 2023-01-31 DIAGNOSIS — I48.0 AF (PAROXYSMAL ATRIAL FIBRILLATION) (CMD): ICD-10-CM

## 2023-01-31 LAB
ANION GAP SERPL CALC-SCNC: 9 MMOL/L (ref 7–19)
ATRIAL RATE (BPM): 71
BUN SERPL-MCNC: 21 MG/DL (ref 6–20)
BUN/CREAT SERPL: 15 (ref 7–25)
CALCIUM SERPL-MCNC: 9.4 MG/DL (ref 8.4–10.2)
CHLORIDE SERPL-SCNC: 105 MMOL/L (ref 97–110)
CO2 SERPL-SCNC: 29 MMOL/L (ref 21–32)
CREAT SERPL-MCNC: 1.4 MG/DL (ref 0.67–1.17)
FASTING DURATION TIME PATIENT: ABNORMAL H
GFR SERPLBLD BASED ON 1.73 SQ M-ARVRAT: 55 ML/MIN
GLUCOSE SERPL-MCNC: 128 MG/DL (ref 70–99)
MAGNESIUM SERPL-MCNC: 2 MG/DL (ref 1.7–2.4)
P AXIS (DEGREES): 75
POTASSIUM SERPL-SCNC: 3.8 MMOL/L (ref 3.4–5.1)
PR-INTERVAL (MSEC): 230
QRS-INTERVAL (MSEC): 94
QRS-INTERVAL (MSEC): 96
QT-INTERVAL (MSEC): 376
QT-INTERVAL (MSEC): 408
QTC: 434
QTC: 443
R AXIS (DEGREES): -26
R AXIS (DEGREES): -28
REPORT TEXT: NORMAL
REPORT TEXT: NORMAL
SODIUM SERPL-SCNC: 139 MMOL/L (ref 135–145)
T AXIS (DEGREES): 10
T AXIS (DEGREES): 12
VENTRICULAR RATE EKG/MIN (BPM): 71
VENTRICULAR RATE EKG/MIN (BPM): 80

## 2023-01-31 PROCEDURE — 83735 ASSAY OF MAGNESIUM: CPT | Performed by: INTERNAL MEDICINE

## 2023-01-31 PROCEDURE — 10002801 HB RX 250 W/O HCPCS: Performed by: INTERNAL MEDICINE

## 2023-01-31 PROCEDURE — 80048 BASIC METABOLIC PNL TOTAL CA: CPT | Performed by: INTERNAL MEDICINE

## 2023-01-31 PROCEDURE — 92960 CARDIOVERSION ELECTRIC EXT: CPT

## 2023-01-31 PROCEDURE — 13000001 HB PHASE II RECOVERY EA 30 MINUTES

## 2023-01-31 PROCEDURE — 36415 COLL VENOUS BLD VENIPUNCTURE: CPT | Performed by: INTERNAL MEDICINE

## 2023-01-31 PROCEDURE — 93005 ELECTROCARDIOGRAM TRACING: CPT | Performed by: INTERNAL MEDICINE

## 2023-01-31 RX ORDER — 0.9 % SODIUM CHLORIDE 0.9 %
2 VIAL (ML) INJECTION EVERY 12 HOURS SCHEDULED
Status: DISCONTINUED | OUTPATIENT
Start: 2023-01-31 | End: 2023-02-01 | Stop reason: HOSPADM

## 2023-01-31 RX ORDER — METHOHEXITAL IN WATER/PF 100MG/10ML
1 SYRINGE (ML) INTRAVENOUS ONCE
Status: DISCONTINUED | OUTPATIENT
Start: 2023-01-31 | End: 2023-02-01 | Stop reason: HOSPADM

## 2023-01-31 RX ORDER — METHOHEXITAL IN WATER/PF 100MG/10ML
SYRINGE (ML) INTRAVENOUS PRN
Status: COMPLETED | OUTPATIENT
Start: 2023-01-31 | End: 2023-01-31

## 2023-01-31 RX ADMIN — Medication 30 MG: at 10:36

## 2023-01-31 RX ADMIN — Medication 30 MG: at 10:39

## 2023-01-31 ASSESSMENT — PAIN SCALES - GENERAL
PAINLEVEL_OUTOF10: 0

## 2023-09-12 ENCOUNTER — HOSPITAL ENCOUNTER (OUTPATIENT)
Age: 68
End: 2023-09-12
Attending: INTERNAL MEDICINE | Admitting: INTERNAL MEDICINE

## 2023-09-12 ENCOUNTER — PREP FOR CASE (OUTPATIENT)
Dept: CARDIOLOGY | Age: 68
End: 2023-09-12

## 2023-09-12 DIAGNOSIS — I48.91 ATRIAL FIBRILLATION, UNSPECIFIED TYPE (CMD): Primary | ICD-10-CM

## 2023-09-12 DIAGNOSIS — I48.91 ATRIAL FIBRILLATION, UNSPECIFIED TYPE (CMD): ICD-10-CM

## 2023-09-25 RX ORDER — FENOFIBRATE 160 MG/1
160 TABLET ORAL DAILY
COMMUNITY

## 2023-09-26 ENCOUNTER — HOSPITAL ENCOUNTER (OUTPATIENT)
Dept: CARDIOLOGY | Age: 68
End: 2023-09-26
Attending: INTERNAL MEDICINE

## 2023-09-26 ENCOUNTER — ANESTHESIA EVENT (OUTPATIENT)
Dept: CARDIOLOGY | Age: 68
End: 2023-09-26

## 2023-09-26 ENCOUNTER — ANESTHESIA (OUTPATIENT)
Dept: CARDIOLOGY | Age: 68
End: 2023-09-26

## 2023-09-27 RX ORDER — LATANOPROST 50 UG/ML
1 SOLUTION/ DROPS OPHTHALMIC NIGHTLY
COMMUNITY

## 2023-09-27 RX ORDER — MOMETASONE FUROATE 50 UG/1
1 SPRAY, METERED NASAL DAILY
COMMUNITY

## 2023-09-27 RX ORDER — FENOFIBRATE 160 MG/1
160 TABLET ORAL DAILY
COMMUNITY

## 2023-09-27 NOTE — PAT NURSING NOTE
PreOp Instructions for cryoablation     You are scheduled for: a Cardiac Procedure     Date of Procedure: 10/02/23     Diet Instructions: Do not eat or drink anything after midnight     Medications: Medications you are allowed to take can be taken with a sip of water the morning of your procedure. Hold torsemide     Do not take the following Blood Thinner(s): last dose of xarelto 9/30     Sleep Apnea: If you have sleep apnea, please bring your mask and tubing     Skin Prep: Shower with antibacterial soap using a clean washcloth, prior to procedure     Arrival Time: You will receive a call the afternoon before your procedure between 2 pm to 5 pm on what time you should arrive the day of your procedure    Driving After Procedure: If sedation is given, you WILL NOT be able to drive home. You will need a responsible adult  to drive you home. Discharge Teaching: Your nurse will give you specific instructions before discharge; Most people can resume normal activities in 2-3 days; Any questions, please call the physician's office        [facility]. Check in at the [place] reception desk. Our  will be there to check you in for your procedure. Please bring your insurance cards and ID with you.  [employer] parking is available starting at 5 am.

## 2023-10-02 ENCOUNTER — APPOINTMENT (OUTPATIENT)
Dept: INTERVENTIONAL RADIOLOGY/VASCULAR | Facility: HOSPITAL | Age: 68
End: 2023-10-02
Attending: INTERNAL MEDICINE
Payer: MEDICARE

## 2023-10-02 ENCOUNTER — HOSPITAL ENCOUNTER (OUTPATIENT)
Dept: CV DIAGNOSTICS | Facility: HOSPITAL | Age: 68
Discharge: HOME | End: 2023-10-02
Attending: INTERNAL MEDICINE
Payer: MEDICARE

## 2023-10-02 ENCOUNTER — HOSPITAL ENCOUNTER (OUTPATIENT)
Dept: INTERVENTIONAL RADIOLOGY/VASCULAR | Facility: HOSPITAL | Age: 68
Discharge: HOME | End: 2023-10-03
Attending: INTERNAL MEDICINE | Admitting: INTERNAL MEDICINE
Payer: MEDICARE

## 2023-10-02 DIAGNOSIS — I48.91 A-FIB (HCC): ICD-10-CM

## 2023-10-02 LAB
ATRIAL RATE: 117 BPM
ATRIAL RATE: 80 BPM
ISTAT ACTIVATED CLOTTING TIME: 239 SECONDS (ref 74–137)
ISTAT ACTIVATED CLOTTING TIME: 245 SECONDS (ref 74–137)
ISTAT ACTIVATED CLOTTING TIME: 245 SECONDS (ref 74–137)
P AXIS: 49 DEGREES
P-R INTERVAL: 262 MS
Q-T INTERVAL: 382 MS
Q-T INTERVAL: 386 MS
QRS DURATION: 102 MS
QRS DURATION: 98 MS
QTC CALCULATION (BEZET): 436 MS
QTC CALCULATION (BEZET): 440 MS
R AXIS: -16 DEGREES
R AXIS: -27 DEGREES
T AXIS: 17 DEGREES
T AXIS: 22 DEGREES
VENTRICULAR RATE: 77 BPM
VENTRICULAR RATE: 80 BPM

## 2023-10-02 PROCEDURE — 93320 DOPPLER ECHO COMPLETE: CPT | Performed by: INTERNAL MEDICINE

## 2023-10-02 PROCEDURE — 93655 ICAR CATH ABLTJ DSCRT ARRHYT: CPT | Performed by: INTERNAL MEDICINE

## 2023-10-02 PROCEDURE — 4A023FZ MEASUREMENT OF CARDIAC RHYTHM, PERCUTANEOUS APPROACH: ICD-10-PCS | Performed by: INTERNAL MEDICINE

## 2023-10-02 PROCEDURE — 02K83ZZ MAP CONDUCTION MECHANISM, PERCUTANEOUS APPROACH: ICD-10-PCS | Performed by: INTERNAL MEDICINE

## 2023-10-02 PROCEDURE — 02583ZZ DESTRUCTION OF CONDUCTION MECHANISM, PERCUTANEOUS APPROACH: ICD-10-PCS | Performed by: INTERNAL MEDICINE

## 2023-10-02 PROCEDURE — 85347 COAGULATION TIME ACTIVATED: CPT

## 2023-10-02 PROCEDURE — 99152 MOD SED SAME PHYS/QHP 5/>YRS: CPT | Performed by: INTERNAL MEDICINE

## 2023-10-02 PROCEDURE — 99153 MOD SED SAME PHYS/QHP EA: CPT | Performed by: INTERNAL MEDICINE

## 2023-10-02 PROCEDURE — B24CZZ3 ULTRASONOGRAPHY OF PERICARDIUM, INTRAVASCULAR: ICD-10-PCS | Performed by: INTERNAL MEDICINE

## 2023-10-02 PROCEDURE — 93656 COMPRE EP EVAL ABLTJ ATR FIB: CPT | Performed by: INTERNAL MEDICINE

## 2023-10-02 PROCEDURE — 93010 ELECTROCARDIOGRAM REPORT: CPT | Performed by: INTERNAL MEDICINE

## 2023-10-02 PROCEDURE — 4A0234Z MEASUREMENT OF CARDIAC ELECTRICAL ACTIVITY, PERCUTANEOUS APPROACH: ICD-10-PCS | Performed by: INTERNAL MEDICINE

## 2023-10-02 PROCEDURE — 93312 ECHO TRANSESOPHAGEAL: CPT | Performed by: INTERNAL MEDICINE

## 2023-10-02 PROCEDURE — 93005 ELECTROCARDIOGRAM TRACING: CPT

## 2023-10-02 PROCEDURE — 93325 DOPPLER ECHO COLOR FLOW MAPG: CPT | Performed by: INTERNAL MEDICINE

## 2023-10-02 RX ORDER — SODIUM CHLORIDE 9 MG/ML
INJECTION, SOLUTION INTRAVENOUS
Status: COMPLETED | OUTPATIENT
Start: 2023-10-02 | End: 2023-10-02

## 2023-10-02 RX ORDER — FLUTICASONE PROPIONATE 50 MCG
2 SPRAY, SUSPENSION (ML) NASAL DAILY
Status: DISCONTINUED | OUTPATIENT
Start: 2023-10-03 | End: 2023-10-03

## 2023-10-02 RX ORDER — PANTOPRAZOLE SODIUM 40 MG/1
40 TABLET, DELAYED RELEASE ORAL
Status: DISCONTINUED | OUTPATIENT
Start: 2023-10-03 | End: 2023-10-03

## 2023-10-02 RX ORDER — LIDOCAINE HYDROCHLORIDE 10 MG/ML
INJECTION, SOLUTION EPIDURAL; INFILTRATION; INTRACAUDAL; PERINEURAL
Status: COMPLETED
Start: 2023-10-02 | End: 2023-10-02

## 2023-10-02 RX ORDER — ROSUVASTATIN CALCIUM 40 MG/1
40 TABLET, COATED ORAL NIGHTLY
Status: DISCONTINUED | OUTPATIENT
Start: 2023-10-02 | End: 2023-10-03

## 2023-10-02 RX ORDER — MIDAZOLAM HYDROCHLORIDE 1 MG/ML
INJECTION INTRAMUSCULAR; INTRAVENOUS
Status: COMPLETED
Start: 2023-10-02 | End: 2023-10-02

## 2023-10-02 RX ORDER — METHOHEXITAL IN WATER/PF 100MG/10ML
SYRINGE (ML) INTRAVENOUS
Status: COMPLETED
Start: 2023-10-02 | End: 2023-10-02

## 2023-10-02 RX ORDER — DILTIAZEM HYDROCHLORIDE 120 MG/1
120 CAPSULE, EXTENDED RELEASE ORAL DAILY
Status: DISCONTINUED | OUTPATIENT
Start: 2023-10-02 | End: 2023-10-03

## 2023-10-02 RX ORDER — LATANOPROST 50 UG/ML
1 SOLUTION/ DROPS OPHTHALMIC NIGHTLY
Status: DISCONTINUED | OUTPATIENT
Start: 2023-10-02 | End: 2023-10-03

## 2023-10-02 RX ORDER — ASPIRIN 81 MG/1
81 TABLET ORAL DAILY
Status: DISCONTINUED | OUTPATIENT
Start: 2023-10-02 | End: 2023-10-03

## 2023-10-02 RX ORDER — LOSARTAN POTASSIUM 100 MG/1
100 TABLET ORAL DAILY
Status: DISCONTINUED | OUTPATIENT
Start: 2023-10-02 | End: 2023-10-03

## 2023-10-02 RX ORDER — HEPARIN SODIUM 1000 [USP'U]/ML
INJECTION, SOLUTION INTRAVENOUS; SUBCUTANEOUS
Status: COMPLETED
Start: 2023-10-02 | End: 2023-10-02

## 2023-10-02 RX ORDER — SODIUM CHLORIDE 9 MG/ML
INJECTION, SOLUTION INTRAVENOUS CONTINUOUS
Status: ACTIVE | OUTPATIENT
Start: 2023-10-02 | End: 2023-10-02

## 2023-10-02 RX ORDER — PROTAMINE SULFATE 10 MG/ML
INJECTION, SOLUTION INTRAVENOUS
Status: COMPLETED
Start: 2023-10-02 | End: 2023-10-02

## 2023-10-02 RX ORDER — HEPARIN SODIUM 5000 [USP'U]/ML
INJECTION, SOLUTION INTRAVENOUS; SUBCUTANEOUS
Status: COMPLETED
Start: 2023-10-02 | End: 2023-10-02

## 2023-10-02 RX ORDER — FENOFIBRATE 134 MG/1
134 CAPSULE ORAL
Status: DISCONTINUED | OUTPATIENT
Start: 2023-10-03 | End: 2023-10-03

## 2023-10-02 RX ADMIN — SODIUM CHLORIDE: 9 INJECTION, SOLUTION INTRAVENOUS at 09:25:00

## 2023-10-02 RX ADMIN — ASPIRIN 81 MG: 81 TABLET ORAL at 17:31:00

## 2023-10-02 RX ADMIN — LOSARTAN POTASSIUM 100 MG: 100 TABLET ORAL at 17:31:00

## 2023-10-02 RX ADMIN — DILTIAZEM HYDROCHLORIDE 120 MG: 120 CAPSULE, EXTENDED RELEASE ORAL at 17:31:00

## 2023-10-02 RX ADMIN — ROSUVASTATIN CALCIUM 40 MG: 40 TABLET, COATED ORAL at 20:40:00

## 2023-10-02 RX ADMIN — LATANOPROST 1 DROP: 50 SOLUTION/ DROPS OPHTHALMIC at 20:40:00

## 2023-10-02 RX ADMIN — MIDAZOLAM HYDROCHLORIDE 4 MG: 1 INJECTION INTRAMUSCULAR; INTRAVENOUS at 10:50:00

## 2023-10-02 NOTE — PLAN OF CARE
Received pt at approx 1345 from cath lab. Pt has bilateral groin incisions, dressing in place, soft, no hematoma. Pt is A&Ox4, no complaints of pain. Pt is on room air, lungs are clear/diminished bilaterally, no coughing. Pt is in NSR, no complaints of chest pain. Non-pitting edema present. He is continent of B&B, active bowel sounds, abdomen non-tender, last BM 10-2. Pt updated with plan of care. Approx 1700- EKG ordered, completed already post cryoablation- no need to repeat again per Dr Gomez Guevara. Approx 1800- skin check performed with ENID Kitchen. Bilateral groin incisions present post procedure. Bruising/redness present on RLE.          Problem: Diabetes/Glucose Control  Goal: Glucose maintained within prescribed range  Description: INTERVENTIONS:  - Monitor Blood Glucose as ordered  - Assess for signs and symptoms of hyperglycemia and hypoglycemia  - Administer ordered medications to maintain glucose within target range  - Assess barriers to adequate nutritional intake and initiate nutrition consult as needed  - Instruct patient on self management of diabetes  Outcome: Progressing     Problem: Patient/Family Goals  Goal: Patient/Family Long Term Goal  Description: Patient's Long Term Goal: stay out of hospital 10-2    Interventions:  - med compliance, follow ups    - See additional Care Plan goals for specific interventions  Outcome: Progressing  Goal: Patient/Family Short Term Goal  Description: Patient's Short Term Goal: no pain 10-2    Interventions:   - PRN pain meds, hot/cold packs, tell nurse if in pain     - See additional Care Plan goals for specific interventions  Outcome: Progressing     Problem: CARDIOVASCULAR SYSTEM  Goal: Maintain optimal cardiac output and hemodynamic stability  Description: Interventions:  - Monitor blood pressure and heart rate  - Monitor pulses and perfusion  - Monitor urine output  - Assess for signs and symptoms of decreased cardiac output  - Administer fluids as ordered  - Administer vasoactive medications as ordered  Outcome: Progressing

## 2023-10-02 NOTE — PROGRESS NOTES
Electrophysiology Study with Radiofrequency Ablation      History:  Danii Graham is a 80year old male with fib and flutter. The risks and benefits of the procedure (including, but not limited to, hematoma, vascular damage, pneumothorax, tamponade, need for a pacemaker, myocardial infarction, stroke, and death) were discussed. The patient understands and agrees to proceed. Procedure: The patient was brought to the electrophysiology study in the fasting and nonsedated state. The left and right groins were prepped and draped in the usual sterile fashion. The IV was maintained by the RN and moderate sedation was achieved with Fentanyl and Versed were administered in divided doses under continuous monitoring of oxygenation, blood pressure, and heart rate. A total of 6 of versed and 150 of fentanyl were given. 1% lidocaine was used for skin anesthesia. Time of 1st sedation dosing was 1100. Procedure end time was 1321. Sedation was provided by an independent clinician under my direct supervision. An 14 Fr sheath was placed in the right femoral vein and a 10Fr and a 7 Fr sheath were placed in the left femoral vein, all by modified Seldinger technique. Catheters were placed in the appropriate positions under fluoroscopic guidance. Procedures performed:   Comprehensive EP study   Pacing and recording of the LA from the CS   3D mapping of tachycardia with [facility] [place] Nav[facility]   Ablation Of atrial fibrillation    After programmed stimulation and ablation, the catheters and sheaths were removed and hemostasis was achieved with manual compression. Results:  Baseline intervals: SCL: 760, IN: NA, QRS: 100, QT: 420      Mapping and Ablation:  Heparin was administered prior intravenously in doses to maintain an ACT between 300-350s. A single 360J synchronized shock cardioverted the patient to NSR. An agilis sheath was used via the 14F sheath.   An open irrigated tacticath catheter was placed at the CTI with small A and large V signal at 6 oclock in Syrian angulation. In this location ablation lesions were delivered (35W, 42d, 600g/s, 15 ohm impedance drops) guided by electrograms, 3d mapping and flouroscopy. Ablation lesions were delivered pulling back 4mm per lesion. As the catheter was pulled back into the IVC the flutter terminated and BDB was achieved with TICT 160ms in both directions. This persisted despite a 20 minute waiting period. Transseptal access across the fossa ovalis was obtained using an SL1 sheath with a [place] needle under fluoroscopic and intracardiac guidance. Once access was obtained with the needle, the sheath/dilator apparatus was advanced to the LA. Then the LUPV was cannulated with the SL1 wire and the 14F and SL1 sheath were removed and the cryosheath, balloon and lasso-wire were advanced into the LA. Sequential occluding venograms were obtained in the LUPV, LLPV, RLPV and then RUPV and then cryoballon energy was delivered in each vein for at least 150 seconds with obliteration of PV signals (or shortened if -57D celsius was achieved with temperature). With right sided ablation the CS catheter was placed in the subclavian vein and the phrenic nerve was paced during ablation to confirm lack of phrenic nerve damage. PV signal obliterations was seen in all 4 veins. After a waiting period the PV's were re-interrogated and confirmed to have entrance block. The equipment was pulled out of the left atrium and long sheaths were exchanged for short sheaths, protamine was given and the veins were closed using vascade closure devices. The patient awoke neurologically and hemodynamically intact. ICE confirmed lack of a pericardial effusion before and after the case. Conclusions:   --Successful ablation of atrial fibrillation consisting of cryoballoon energy delivery in each vein with documented entrance block in all veins despite a waiting period.       Jose Wilkerson MD  Clinical Cardiac Electrophysiology  [facility]

## 2023-10-03 VITALS
OXYGEN SATURATION: 96 % | BODY MASS INDEX: 40 KG/M2 | HEART RATE: 71 BPM | DIASTOLIC BLOOD PRESSURE: 77 MMHG | RESPIRATION RATE: 15 BRPM | WEIGHT: 290 LBS | TEMPERATURE: 98 F | SYSTOLIC BLOOD PRESSURE: 125 MMHG

## 2023-10-03 RX ADMIN — PANTOPRAZOLE SODIUM 40 MG: 40 TABLET, DELAYED RELEASE ORAL at 06:24:00

## 2023-10-03 RX ADMIN — LOSARTAN POTASSIUM 100 MG: 100 TABLET ORAL at 09:09:00

## 2023-10-03 RX ADMIN — ASPIRIN 81 MG: 81 TABLET ORAL at 09:09:00

## 2023-10-03 RX ADMIN — DILTIAZEM HYDROCHLORIDE 120 MG: 120 CAPSULE, EXTENDED RELEASE ORAL at 09:09:00

## 2023-10-03 RX ADMIN — FLUTICASONE PROPIONATE 2 SPRAY: 50 MCG SPRAY, SUSPENSION (ML) NASAL at 09:05:00

## 2023-10-03 RX ADMIN — FENOFIBRATE 134 MG: 134 CAPSULE ORAL at 09:09:00

## 2023-10-03 NOTE — PLAN OF CARE
Problem: Diabetes/Glucose Control  Goal: Glucose maintained within prescribed range  Description: INTERVENTIONS:  - Monitor Blood Glucose as ordered  - Assess for signs and symptoms of hyperglycemia and hypoglycemia  - Administer ordered medications to maintain glucose within target range  - Assess barriers to adequate nutritional intake and initiate nutrition consult as needed  - Instruct patient on self management of diabetes  10/3/2023 1125 by Serenity Haider RN  Outcome: Adequate for Discharge  10/3/2023 1030 by Serenity Haider RN  Outcome: Progressing     Problem: Patient/Family Goals  Goal: Patient/Family Long Term Goal  Description: Patient's Long Term Goal: stay out of hospital 10-2    Interventions:  - med compliance, follow ups    - See additional Care Plan goals for specific interventions  10/3/2023 1125 by Serenity Haider RN  Outcome: Adequate for Discharge  10/3/2023 1030 by Serenity Haider RN  Outcome: Progressing  Goal: Patient/Family Short Term Goal  Description: Patient's Short Term Goal: no pain 10-2    Interventions:   - PRN pain meds, hot/cold packs, tell nurse if in pain     - See additional Care Plan goals for specific interventions  10/3/2023 1125 by Serenity Haider RN  Outcome: Adequate for Discharge  10/3/2023 1030 by Serenity Haider RN  Outcome: Progressing     Problem: CARDIOVASCULAR SYSTEM  Goal: Maintain optimal cardiac output and hemodynamic stability  Description: Interventions:  - Monitor blood pressure and heart rate  - Monitor pulses and perfusion  - Monitor urine output  - Assess for signs and symptoms of decreased cardiac output  - Administer fluids as ordered  - Administer vasoactive medications as ordered  10/3/2023 1125 by Serenity Haider RN  Outcome: Adequate for Discharge  10/3/2023 1030 by Serenity Haider RN  Outcome: Progressing     Problem: SKIN/TISSUE INTEGRITY - ADULT  Goal: Incision(s), wounds(s) or drain site(s) healing without S/S of infection  Description: INTERVENTIONS:  - Assess and document risk factors for pressure ulcer development  - Assess and document skin integrity  - Assess and document dressing/incision, wound bed, drain sites and surrounding tissue  - Implement wound care per orders  - Initiate isolation precautions as appropriate  - Initiate Pressure Ulcer prevention bundle as indicated  10/3/2023 1125 by Tolu Gallo RN  Outcome: Adequate for Discharge  10/3/2023 1030 by Tolu Gallo RN  Outcome: Progressing

## 2023-10-03 NOTE — PLAN OF CARE
Bilateral groins has no signs of bleeding  Problem: Diabetes/Glucose Control  Goal: Glucose maintained within prescribed range  Description: INTERVENTIONS:  - Monitor Blood Glucose as ordered  - Assess for signs and symptoms of hyperglycemia and hypoglycemia  - Administer ordered medications to maintain glucose within target range  - Assess barriers to adequate nutritional intake and initiate nutrition consult as needed  - Instruct patient on self management of diabetes  10/3/2023 0003 by Rene Saldana RN  Outcome: Progressing  10/3/2023 0002 by Rene Saldana RN  Outcome: Progressing     Problem: Patient/Family Goals  Goal: Patient/Family Long Term Goal  Description: Patient's Long Term Goal: stay out of hospital 10-2    Interventions:  - med compliance, follow ups    - See additional Care Plan goals for specific interventions  10/3/2023 0003 by Rene Saldana RN  Outcome: Progressing  10/3/2023 0002 by Rene Saldana RN  Outcome: Progressing  Goal: Patient/Family Short Term Goal  Description: Patient's Short Term Goal: no pain 10-2    Interventions:   - PRN pain meds, hot/cold packs, tell nurse if in pain     - See additional Care Plan goals for specific interventions  10/3/2023 0003 by Rene Saldana RN  Outcome: Progressing  10/3/2023 0002 by Rene Saldana RN  Outcome: Progressing     Problem: CARDIOVASCULAR SYSTEM  Goal: Maintain optimal cardiac output and hemodynamic stability  Description: Interventions:  - Monitor blood pressure and heart rate  - Monitor pulses and perfusion  - Monitor urine output  - Assess for signs and symptoms of decreased cardiac output  - Administer fluids as ordered  - Administer vasoactive medications as ordered  10/3/2023 0003 by Rene Saldana RN  Outcome: Progressing  10/3/2023 0002 by Rene Saldana RN  Outcome: Progressing     Problem: SKIN/TISSUE INTEGRITY - ADULT  Goal: Incision(s), wounds(s) or drain site(s) healing without S/S of infection  Description: INTERVENTIONS:  - Assess and document risk factors for pressure ulcer development  - Assess and document skin integrity  - Assess and document dressing/incision, wound bed, drain sites and surrounding tissue  - Implement wound care per orders  - Initiate isolation precautions as appropriate  - Initiate Pressure Ulcer prevention bundle as indicated  10/3/2023 0003 by Sylvie Quintanilla RN  Outcome: Progressing  10/3/2023 0002 by Sylvie Quintanilla RN  Outcome: Progressing

## 2023-10-03 NOTE — PLAN OF CARE
Pt discharged home. IV removed, tele d/c'd and returned to monitor tech. Follow up instructions provided and discussed. Pt and family verbalized understanding. Rx given. Discussed adverse reactions and side effects of all new medications and provided appropriate handouts. Pt and family voiced understanding. Pt wheeled down by staff with all belongings. Pt left denying c/o pain, malaise, or cardiac symptoms. All needs met by staff.

## 2023-10-03 NOTE — PLAN OF CARE
Received care of pt at 0730. A/Ox4, NSR on monitor. No complaints of chest pain. Room air. Lungs clear/diminished bilaterally, no cough present. Non-pitting edema is present. Continent to bladder and bowel, abdomen soft and non-tender. Pt updated with care.      POC: discharge to home today    Problem: Diabetes/Glucose Control  Goal: Glucose maintained within prescribed range  Description: INTERVENTIONS:  - Monitor Blood Glucose as ordered  - Assess for signs and symptoms of hyperglycemia and hypoglycemia  - Administer ordered medications to maintain glucose within target range  - Assess barriers to adequate nutritional intake and initiate nutrition consult as needed  - Instruct patient on self management of diabetes  Outcome: Progressing     Problem: Patient/Family Goals  Goal: Patient/Family Long Term Goal  Description: Patient's Long Term Goal: stay out of hospital 10-2    Interventions:  - med compliance, follow ups    - See additional Care Plan goals for specific interventions  Outcome: Progressing  Goal: Patient/Family Short Term Goal  Description: Patient's Short Term Goal: no pain 10-2    Interventions:   - PRN pain meds, hot/cold packs, tell nurse if in pain     - See additional Care Plan goals for specific interventions  Outcome: Progressing     Problem: CARDIOVASCULAR SYSTEM  Goal: Maintain optimal cardiac output and hemodynamic stability  Description: Interventions:  - Monitor blood pressure and heart rate  - Monitor pulses and perfusion  - Monitor urine output  - Assess for signs and symptoms of decreased cardiac output  - Administer fluids as ordered  - Administer vasoactive medications as ordered  Outcome: Progressing     Problem: SKIN/TISSUE INTEGRITY - ADULT  Goal: Incision(s), wounds(s) or drain site(s) healing without S/S of infection  Description: INTERVENTIONS:  - Assess and document risk factors for pressure ulcer development  - Assess and document skin integrity  - Assess and document dressing/incision, wound bed, drain sites and surrounding tissue  - Implement wound care per orders  - Initiate isolation precautions as appropriate  - Initiate Pressure Ulcer prevention bundle as indicated  Outcome: Progressing

## 2024-03-21 NOTE — TELEPHONE ENCOUNTER
- We will submit prior authorization for Botox injections to your insurance provider. Schedule an appointment today for the Botox injections.     - Continue stretching and doing home exercise program.   Confirmed with  Patient Message Received/yes.   Kevin Mane, 11/23/19, 8:26 AM

## 2025-02-27 ENCOUNTER — WALK IN (OUTPATIENT)
Dept: URGENT CARE | Age: 70
End: 2025-02-27
Attending: FAMILY MEDICINE

## 2025-02-27 VITALS
HEART RATE: 64 BPM | DIASTOLIC BLOOD PRESSURE: 70 MMHG | TEMPERATURE: 98.1 F | SYSTOLIC BLOOD PRESSURE: 133 MMHG | RESPIRATION RATE: 16 BRPM | OXYGEN SATURATION: 98 %

## 2025-02-27 DIAGNOSIS — T21.22XA BURN OF ABDOMEN WALL, SECOND DEGREE, INITIAL ENCOUNTER: Primary | ICD-10-CM

## 2025-02-27 PROCEDURE — 99203 OFFICE O/P NEW LOW 30 MIN: CPT

## 2025-02-27 PROCEDURE — 10002803 HB RX 637: Performed by: FAMILY MEDICINE

## 2025-02-27 RX ORDER — SILVER SULFADIAZINE 10 MG/G
CREAM TOPICAL ONCE
Status: COMPLETED | OUTPATIENT
Start: 2025-02-27 | End: 2025-02-27

## 2025-02-27 RX ORDER — SILVER SULFADIAZINE 10 MG/G
CREAM TOPICAL
Qty: 400 G | Refills: 0 | Status: SHIPPED | OUTPATIENT
Start: 2025-02-27

## 2025-02-27 RX ADMIN — SILVER SULFADIAZINE: 10 CREAM TOPICAL at 12:27

## 2025-02-27 ASSESSMENT — PAIN SCALES - GENERAL: PAINLEVEL: 0
